# Patient Record
Sex: FEMALE | Race: WHITE | NOT HISPANIC OR LATINO | URBAN - METROPOLITAN AREA
[De-identification: names, ages, dates, MRNs, and addresses within clinical notes are randomized per-mention and may not be internally consistent; named-entity substitution may affect disease eponyms.]

---

## 2022-02-06 ENCOUNTER — INPATIENT (INPATIENT)
Facility: HOSPITAL | Age: 72
LOS: 2 days | Discharge: HOME HEALTH | DRG: 300 | End: 2022-02-09
Attending: THORACIC SURGERY (CARDIOTHORACIC VASCULAR SURGERY) | Admitting: THORACIC SURGERY (CARDIOTHORACIC VASCULAR SURGERY)
Payer: COMMERCIAL

## 2022-02-06 VITALS
TEMPERATURE: 98 F | DIASTOLIC BLOOD PRESSURE: 79 MMHG | OXYGEN SATURATION: 94 % | SYSTOLIC BLOOD PRESSURE: 130 MMHG | RESPIRATION RATE: 18 BRPM | HEART RATE: 128 BPM

## 2022-02-06 DIAGNOSIS — I71.00 DISSECTION OF UNSPECIFIED SITE OF AORTA: ICD-10-CM

## 2022-02-06 LAB
ALBUMIN SERPL ELPH-MCNC: 4.5 G/DL — SIGNIFICANT CHANGE UP (ref 3.3–5)
ALP SERPL-CCNC: 114 U/L — SIGNIFICANT CHANGE UP (ref 40–120)
ALT FLD-CCNC: 17 U/L — SIGNIFICANT CHANGE UP (ref 10–45)
ANION GAP SERPL CALC-SCNC: 14 MMOL/L — SIGNIFICANT CHANGE UP (ref 5–17)
APTT BLD: 32.1 SEC — SIGNIFICANT CHANGE UP (ref 27.5–35.5)
AST SERPL-CCNC: 21 U/L — SIGNIFICANT CHANGE UP (ref 10–40)
BASOPHILS # BLD AUTO: 0.04 K/UL — SIGNIFICANT CHANGE UP (ref 0–0.2)
BASOPHILS NFR BLD AUTO: 0.4 % — SIGNIFICANT CHANGE UP (ref 0–2)
BILIRUB SERPL-MCNC: 0.4 MG/DL — SIGNIFICANT CHANGE UP (ref 0.2–1.2)
BUN SERPL-MCNC: 12 MG/DL — SIGNIFICANT CHANGE UP (ref 7–23)
CALCIUM SERPL-MCNC: 9.7 MG/DL — SIGNIFICANT CHANGE UP (ref 8.4–10.5)
CHLORIDE SERPL-SCNC: 97 MMOL/L — SIGNIFICANT CHANGE UP (ref 96–108)
CO2 SERPL-SCNC: 22 MMOL/L — SIGNIFICANT CHANGE UP (ref 22–31)
CREAT SERPL-MCNC: 0.66 MG/DL — SIGNIFICANT CHANGE UP (ref 0.5–1.3)
EOSINOPHIL # BLD AUTO: 0.71 K/UL — HIGH (ref 0–0.5)
EOSINOPHIL NFR BLD AUTO: 6.4 % — HIGH (ref 0–6)
GAS PNL BLDV: SIGNIFICANT CHANGE UP
GLUCOSE SERPL-MCNC: 166 MG/DL — HIGH (ref 70–99)
HCT VFR BLD CALC: 46.7 % — HIGH (ref 34.5–45)
HGB BLD-MCNC: 14.6 G/DL — SIGNIFICANT CHANGE UP (ref 11.5–15.5)
IMM GRANULOCYTES NFR BLD AUTO: 0.4 % — SIGNIFICANT CHANGE UP (ref 0–1.5)
INR BLD: 1.08 RATIO — SIGNIFICANT CHANGE UP (ref 0.88–1.16)
LYMPHOCYTES # BLD AUTO: 1.2 K/UL — SIGNIFICANT CHANGE UP (ref 1–3.3)
LYMPHOCYTES # BLD AUTO: 10.8 % — LOW (ref 13–44)
MCHC RBC-ENTMCNC: 28.6 PG — SIGNIFICANT CHANGE UP (ref 27–34)
MCHC RBC-ENTMCNC: 31.3 GM/DL — LOW (ref 32–36)
MCV RBC AUTO: 91.6 FL — SIGNIFICANT CHANGE UP (ref 80–100)
MONOCYTES # BLD AUTO: 0.81 K/UL — SIGNIFICANT CHANGE UP (ref 0–0.9)
MONOCYTES NFR BLD AUTO: 7.3 % — SIGNIFICANT CHANGE UP (ref 2–14)
NEUTROPHILS # BLD AUTO: 8.34 K/UL — HIGH (ref 1.8–7.4)
NEUTROPHILS NFR BLD AUTO: 74.7 % — SIGNIFICANT CHANGE UP (ref 43–77)
NRBC # BLD: 0 /100 WBCS — SIGNIFICANT CHANGE UP (ref 0–0)
NT-PROBNP SERPL-SCNC: 46 PG/ML — SIGNIFICANT CHANGE UP (ref 0–300)
PLATELET # BLD AUTO: 181 K/UL — SIGNIFICANT CHANGE UP (ref 150–400)
POTASSIUM SERPL-MCNC: 4.1 MMOL/L — SIGNIFICANT CHANGE UP (ref 3.5–5.3)
POTASSIUM SERPL-SCNC: 4.1 MMOL/L — SIGNIFICANT CHANGE UP (ref 3.5–5.3)
PROT SERPL-MCNC: 8.2 G/DL — SIGNIFICANT CHANGE UP (ref 6–8.3)
PROTHROM AB SERPL-ACNC: 12.9 SEC — SIGNIFICANT CHANGE UP (ref 10.6–13.6)
RBC # BLD: 5.1 M/UL — SIGNIFICANT CHANGE UP (ref 3.8–5.2)
RBC # FLD: 14.8 % — HIGH (ref 10.3–14.5)
SARS-COV-2 RNA SPEC QL NAA+PROBE: SIGNIFICANT CHANGE UP
SODIUM SERPL-SCNC: 133 MMOL/L — LOW (ref 135–145)
TROPONIN T, HIGH SENSITIVITY RESULT: 9 NG/L — SIGNIFICANT CHANGE UP (ref 0–51)
WBC # BLD: 11.14 K/UL — HIGH (ref 3.8–10.5)
WBC # FLD AUTO: 11.14 K/UL — HIGH (ref 3.8–10.5)

## 2022-02-06 PROCEDURE — 74174 CTA ABD&PLVS W/CONTRAST: CPT | Mod: 26,MA

## 2022-02-06 PROCEDURE — 99291 CRITICAL CARE FIRST HOUR: CPT

## 2022-02-06 PROCEDURE — 36620 INSERTION CATHETER ARTERY: CPT

## 2022-02-06 PROCEDURE — 93010 ELECTROCARDIOGRAM REPORT: CPT

## 2022-02-06 PROCEDURE — 99221 1ST HOSP IP/OBS SF/LOW 40: CPT | Mod: 25

## 2022-02-06 PROCEDURE — 71275 CT ANGIOGRAPHY CHEST: CPT | Mod: 26,MA

## 2022-02-06 RX ORDER — FENTANYL CITRATE 50 UG/ML
50 INJECTION INTRAVENOUS ONCE
Refills: 0 | Status: DISCONTINUED | OUTPATIENT
Start: 2022-02-06 | End: 2022-02-06

## 2022-02-06 RX ORDER — LABETALOL HCL 100 MG
10 TABLET ORAL ONCE
Refills: 0 | Status: COMPLETED | OUTPATIENT
Start: 2022-02-06 | End: 2022-02-06

## 2022-02-06 RX ORDER — ESMOLOL HCL 100MG/10ML
50 VIAL (ML) INTRAVENOUS
Qty: 2500 | Refills: 0 | Status: DISCONTINUED | OUTPATIENT
Start: 2022-02-06 | End: 2022-02-07

## 2022-02-06 RX ORDER — ESMOLOL HCL 100MG/10ML
35000 VIAL (ML) INTRAVENOUS ONCE
Refills: 0 | Status: COMPLETED | OUTPATIENT
Start: 2022-02-06 | End: 2022-02-06

## 2022-02-06 RX ADMIN — Medication 21 MICROGRAM(S)/KG/MIN: at 22:52

## 2022-02-06 RX ADMIN — Medication 10 MILLIGRAM(S): at 22:50

## 2022-02-06 RX ADMIN — FENTANYL CITRATE 50 MICROGRAM(S): 50 INJECTION INTRAVENOUS at 22:53

## 2022-02-06 NOTE — ED PROVIDER NOTE - MDM PATIENT STATEMENT FOR ADDL TREATMENT
Drysol Pregnancy And Lactation Text: This medication is considered safe during pregnancy and breast feeding. Patient with one or more new problems requiring additional work-up/treatment.

## 2022-02-06 NOTE — ED PROVIDER NOTE - CLINICAL SUMMARY MEDICAL DECISION MAKING FREE TEXT BOX
72 yo F with a pmhx of HTN, HLD presents to the ED with flank pain that stared 2 days ago. noted aortic aneurysm on outpatient CT dated 1/28. flank pain intermittent and severe during episodes. vitals notable for tachycardia. BP in the 150s systolic. other vitals wnl. PE as noted above. concerns for vascular pathology vs PE. will order labs, imaging, ekg, meds, reassess

## 2022-02-06 NOTE — ED ADULT NURSE REASSESSMENT NOTE - NS ED NURSE REASSESS COMMENT FT1
Pt transported to CT with RN, ED tech & CTICU MD on cardiac monitor & O2 to CT. Pt then transported to ICU

## 2022-02-06 NOTE — ED PROVIDER NOTE - ATTENDING CONTRIBUTION TO CARE
few days cp / concerning ctap outpt, sent in  uncomfortable, tachycardic and tachypnic  check for pe and dissection. labs, ro acs. ekg, Symptomatic treatment.

## 2022-02-06 NOTE — ED PROVIDER NOTE - PROGRESS NOTE DETAILS
ct concerning for type b dissection. dw rads, dw ct icu. will start esmolol. -Wesley Bond MD- labetalol 10 given by me from crash cart as we awaiting esmolol from pharmacy. at bedside w ct surg. will admit to ICU .

## 2022-02-06 NOTE — ED PROVIDER NOTE - OBJECTIVE STATEMENT
[id number]  70 yo F with a pmhx of HTN, HLD presents to the ED with flank pain that stared 2 days ago. Her pain worsened over the last day prompting her to come to the ED. She had a CT scan perform as outpatient that found a aortic aneurysm measuring 4.8 cm; has not had follow up as of yet. She states her flank pain is over her R side and does not radiate anywhere else. Her pain is intermittent and rated 10/10 at its worst. no alleviating for exacerbating factors. She denies any other symptoms at bedside.

## 2022-02-06 NOTE — ED ADULT NURSE REASSESSMENT NOTE - NS ED NURSE REASSESS COMMENT FT1
Pt complaining of back pain and visibly uncomfortable. CTICU team at bedside. Pt ordered for fentanyl and Esmolol infusion started with 2nd RN. Per MD, no Esmolol push required d/t BP.

## 2022-02-06 NOTE — ED ADULT NURSE NOTE - OBJECTIVE STATEMENT
Pt is a 72 y/o female pmhx of HTN, HLD presents to the ED BIBA from  for concern flank pain. Pt began having flank pain 2 weeks ago that would come & go, she had an outpatient CT scan done on 1/28/22 that showed renal masses & an aortic aneurysm measuring 4.8cm. She had not yet followed up with her MD regarding results but today was having epigastric discomfort & pain under her L breast in addition to flank pain. She went to  and was advised to go to the ED. Pt presents alert & oriented x 4, anxious, able to follow commands, speech clear. Purse lip breathing noted, tachypneic with a respiratory rate of 35. On cardiac monitor, sinus tach to 130's. Abdomen firm & slightly distended. Spontaneously moving all extremities, ambulates without assist. Strong peripheral pulses noted b/l, no edema noted. Equal radial pulses b/l. Skin warm, clean, dry & intact. Denies n/v/d, fever, chills, weakness, dizziness, changes in vision. Pt is a former smoker and quit smoking last month. MD Bond at bedside. Call grajeda within reach, bed in lowest position, side rails up, wheels locked.

## 2022-02-07 DIAGNOSIS — F17.200 NICOTINE DEPENDENCE, UNSPECIFIED, UNCOMPLICATED: ICD-10-CM

## 2022-02-07 DIAGNOSIS — E03.9 HYPOTHYROIDISM, UNSPECIFIED: ICD-10-CM

## 2022-02-07 DIAGNOSIS — E78.5 HYPERLIPIDEMIA, UNSPECIFIED: ICD-10-CM

## 2022-02-07 DIAGNOSIS — R91.1 SOLITARY PULMONARY NODULE: ICD-10-CM

## 2022-02-07 DIAGNOSIS — I10 ESSENTIAL (PRIMARY) HYPERTENSION: ICD-10-CM

## 2022-02-07 DIAGNOSIS — I71.01 DISSECTION OF THORACIC AORTA: ICD-10-CM

## 2022-02-07 LAB
A1C WITH ESTIMATED AVERAGE GLUCOSE RESULT: 6.6 % — HIGH (ref 4–5.6)
ALBUMIN SERPL ELPH-MCNC: 4.2 G/DL — SIGNIFICANT CHANGE UP (ref 3.3–5)
ALP SERPL-CCNC: 105 U/L — SIGNIFICANT CHANGE UP (ref 40–120)
ALT FLD-CCNC: 14 U/L — SIGNIFICANT CHANGE UP (ref 10–45)
ANION GAP SERPL CALC-SCNC: 14 MMOL/L — SIGNIFICANT CHANGE UP (ref 5–17)
APPEARANCE UR: CLEAR — SIGNIFICANT CHANGE UP
APTT BLD: 31.8 SEC — SIGNIFICANT CHANGE UP (ref 27.5–35.5)
AST SERPL-CCNC: 13 U/L — SIGNIFICANT CHANGE UP (ref 10–40)
BACTERIA # UR AUTO: ABNORMAL
BASOPHILS # BLD AUTO: 0.03 K/UL — SIGNIFICANT CHANGE UP (ref 0–0.2)
BASOPHILS NFR BLD AUTO: 0.3 % — SIGNIFICANT CHANGE UP (ref 0–2)
BILIRUB SERPL-MCNC: 0.5 MG/DL — SIGNIFICANT CHANGE UP (ref 0.2–1.2)
BILIRUB UR-MCNC: NEGATIVE — SIGNIFICANT CHANGE UP
BLD GP AB SCN SERPL QL: NEGATIVE — SIGNIFICANT CHANGE UP
BUN SERPL-MCNC: 12 MG/DL — SIGNIFICANT CHANGE UP (ref 7–23)
CALCIUM SERPL-MCNC: 9.1 MG/DL — SIGNIFICANT CHANGE UP (ref 8.4–10.5)
CHLORIDE SERPL-SCNC: 97 MMOL/L — SIGNIFICANT CHANGE UP (ref 96–108)
CO2 SERPL-SCNC: 21 MMOL/L — LOW (ref 22–31)
COLOR SPEC: SIGNIFICANT CHANGE UP
CREAT SERPL-MCNC: 0.74 MG/DL — SIGNIFICANT CHANGE UP (ref 0.5–1.3)
DIFF PNL FLD: NEGATIVE — SIGNIFICANT CHANGE UP
EOSINOPHIL # BLD AUTO: 0.66 K/UL — HIGH (ref 0–0.5)
EOSINOPHIL NFR BLD AUTO: 6 % — SIGNIFICANT CHANGE UP (ref 0–6)
EPI CELLS # UR: 5 /HPF — SIGNIFICANT CHANGE UP
ESTIMATED AVERAGE GLUCOSE: 143 MG/DL — HIGH (ref 68–114)
GAS PNL BLDA: SIGNIFICANT CHANGE UP
GAS PNL BLDA: SIGNIFICANT CHANGE UP
GLUCOSE BLDC GLUCOMTR-MCNC: 127 MG/DL — HIGH (ref 70–99)
GLUCOSE BLDC GLUCOMTR-MCNC: 130 MG/DL — HIGH (ref 70–99)
GLUCOSE BLDC GLUCOMTR-MCNC: 136 MG/DL — HIGH (ref 70–99)
GLUCOSE BLDC GLUCOMTR-MCNC: 156 MG/DL — HIGH (ref 70–99)
GLUCOSE SERPL-MCNC: 152 MG/DL — HIGH (ref 70–99)
GLUCOSE UR QL: NEGATIVE — SIGNIFICANT CHANGE UP
HCT VFR BLD CALC: 41.5 % — SIGNIFICANT CHANGE UP (ref 34.5–45)
HCV AB S/CO SERPL IA: 0.15 S/CO — SIGNIFICANT CHANGE UP (ref 0–0.99)
HCV AB S/CO SERPL IA: 0.17 S/CO — SIGNIFICANT CHANGE UP (ref 0–0.99)
HCV AB SERPL-IMP: SIGNIFICANT CHANGE UP
HCV AB SERPL-IMP: SIGNIFICANT CHANGE UP
HGB BLD-MCNC: 13.4 G/DL — SIGNIFICANT CHANGE UP (ref 11.5–15.5)
HYALINE CASTS # UR AUTO: 8 /LPF — HIGH (ref 0–2)
IMM GRANULOCYTES NFR BLD AUTO: 0.5 % — SIGNIFICANT CHANGE UP (ref 0–1.5)
INR BLD: 1.13 RATIO — SIGNIFICANT CHANGE UP (ref 0.88–1.16)
KETONES UR-MCNC: NEGATIVE — SIGNIFICANT CHANGE UP
LEUKOCYTE ESTERASE UR-ACNC: NEGATIVE — SIGNIFICANT CHANGE UP
LYMPHOCYTES # BLD AUTO: 1.17 K/UL — SIGNIFICANT CHANGE UP (ref 1–3.3)
LYMPHOCYTES # BLD AUTO: 10.7 % — LOW (ref 13–44)
MCHC RBC-ENTMCNC: 29 PG — SIGNIFICANT CHANGE UP (ref 27–34)
MCHC RBC-ENTMCNC: 32.3 GM/DL — SIGNIFICANT CHANGE UP (ref 32–36)
MCV RBC AUTO: 89.8 FL — SIGNIFICANT CHANGE UP (ref 80–100)
MONOCYTES # BLD AUTO: 0.88 K/UL — SIGNIFICANT CHANGE UP (ref 0–0.9)
MONOCYTES NFR BLD AUTO: 8.1 % — SIGNIFICANT CHANGE UP (ref 2–14)
MRSA PCR RESULT.: SIGNIFICANT CHANGE UP
NEUTROPHILS # BLD AUTO: 8.13 K/UL — HIGH (ref 1.8–7.4)
NEUTROPHILS NFR BLD AUTO: 74.4 % — SIGNIFICANT CHANGE UP (ref 43–77)
NITRITE UR-MCNC: POSITIVE
NRBC # BLD: 0 /100 WBCS — SIGNIFICANT CHANGE UP (ref 0–0)
NT-PROBNP SERPL-SCNC: 73 PG/ML — SIGNIFICANT CHANGE UP (ref 0–300)
PH UR: 7 — SIGNIFICANT CHANGE UP (ref 5–8)
PLATELET # BLD AUTO: 147 K/UL — LOW (ref 150–400)
POTASSIUM SERPL-MCNC: 3.8 MMOL/L — SIGNIFICANT CHANGE UP (ref 3.5–5.3)
POTASSIUM SERPL-SCNC: 3.8 MMOL/L — SIGNIFICANT CHANGE UP (ref 3.5–5.3)
PROT SERPL-MCNC: 7.6 G/DL — SIGNIFICANT CHANGE UP (ref 6–8.3)
PROT UR-MCNC: ABNORMAL
PROTHROM AB SERPL-ACNC: 13.5 SEC — SIGNIFICANT CHANGE UP (ref 10.6–13.6)
RBC # BLD: 4.62 M/UL — SIGNIFICANT CHANGE UP (ref 3.8–5.2)
RBC # FLD: 14.9 % — HIGH (ref 10.3–14.5)
RBC CASTS # UR COMP ASSIST: 3 /HPF — SIGNIFICANT CHANGE UP (ref 0–4)
RH IG SCN BLD-IMP: POSITIVE — SIGNIFICANT CHANGE UP
RH IG SCN BLD-IMP: POSITIVE — SIGNIFICANT CHANGE UP
S AUREUS DNA NOSE QL NAA+PROBE: SIGNIFICANT CHANGE UP
SODIUM SERPL-SCNC: 132 MMOL/L — LOW (ref 135–145)
SP GR SPEC: >1.05 (ref 1.01–1.02)
T4 AB SER-ACNC: 3.5 UG/DL — LOW (ref 4.6–12)
TSH SERPL-MCNC: 9.71 UIU/ML — HIGH (ref 0.27–4.2)
UROBILINOGEN FLD QL: NEGATIVE — SIGNIFICANT CHANGE UP
WBC # BLD: 10.93 K/UL — HIGH (ref 3.8–10.5)
WBC # FLD AUTO: 10.93 K/UL — HIGH (ref 3.8–10.5)
WBC UR QL: 2 /HPF — SIGNIFICANT CHANGE UP (ref 0–5)

## 2022-02-07 PROCEDURE — 99291 CRITICAL CARE FIRST HOUR: CPT

## 2022-02-07 PROCEDURE — 93010 ELECTROCARDIOGRAM REPORT: CPT

## 2022-02-07 PROCEDURE — 74018 RADEX ABDOMEN 1 VIEW: CPT | Mod: 26

## 2022-02-07 PROCEDURE — 93306 TTE W/DOPPLER COMPLETE: CPT | Mod: 26

## 2022-02-07 PROCEDURE — 71045 X-RAY EXAM CHEST 1 VIEW: CPT | Mod: 26

## 2022-02-07 PROCEDURE — 93880 EXTRACRANIAL BILAT STUDY: CPT | Mod: 26

## 2022-02-07 RX ORDER — SODIUM CHLORIDE 9 MG/ML
1000 INJECTION, SOLUTION INTRAVENOUS
Refills: 0 | Status: DISCONTINUED | OUTPATIENT
Start: 2022-02-07 | End: 2022-02-08

## 2022-02-07 RX ORDER — DEXTROSE 50 % IN WATER 50 %
25 SYRINGE (ML) INTRAVENOUS ONCE
Refills: 0 | Status: DISCONTINUED | OUTPATIENT
Start: 2022-02-07 | End: 2022-02-08

## 2022-02-07 RX ORDER — GLUCAGON INJECTION, SOLUTION 0.5 MG/.1ML
1 INJECTION, SOLUTION SUBCUTANEOUS ONCE
Refills: 0 | Status: DISCONTINUED | OUTPATIENT
Start: 2022-02-07 | End: 2022-02-08

## 2022-02-07 RX ORDER — LOVASTATIN 20 MG
0 TABLET ORAL
Qty: 0 | Refills: 0 | DISCHARGE

## 2022-02-07 RX ORDER — LABETALOL HCL 100 MG
0.5 TABLET ORAL
Qty: 200 | Refills: 0 | Status: DISCONTINUED | OUTPATIENT
Start: 2022-02-07 | End: 2022-02-07

## 2022-02-07 RX ORDER — METOPROLOL TARTRATE 50 MG
25 TABLET ORAL EVERY 12 HOURS
Refills: 0 | Status: DISCONTINUED | OUTPATIENT
Start: 2022-02-07 | End: 2022-02-07

## 2022-02-07 RX ORDER — DEXTROSE 50 % IN WATER 50 %
12.5 SYRINGE (ML) INTRAVENOUS ONCE
Refills: 0 | Status: DISCONTINUED | OUTPATIENT
Start: 2022-02-07 | End: 2022-02-08

## 2022-02-07 RX ORDER — LEVOTHYROXINE SODIUM 125 MCG
50 TABLET ORAL AT BEDTIME
Refills: 0 | Status: DISCONTINUED | OUTPATIENT
Start: 2022-02-07 | End: 2022-02-08

## 2022-02-07 RX ORDER — IPRATROPIUM/ALBUTEROL SULFATE 18-103MCG
3 AEROSOL WITH ADAPTER (GRAM) INHALATION EVERY 6 HOURS
Refills: 0 | Status: DISCONTINUED | OUTPATIENT
Start: 2022-02-07 | End: 2022-02-09

## 2022-02-07 RX ORDER — ALBUMIN HUMAN 25 %
250 VIAL (ML) INTRAVENOUS ONCE
Refills: 0 | Status: COMPLETED | OUTPATIENT
Start: 2022-02-07 | End: 2022-02-07

## 2022-02-07 RX ORDER — ASPIRIN/CALCIUM CARB/MAGNESIUM 324 MG
81 TABLET ORAL DAILY
Refills: 0 | Status: DISCONTINUED | OUTPATIENT
Start: 2022-02-07 | End: 2022-02-07

## 2022-02-07 RX ORDER — METOPROLOL TARTRATE 50 MG
25 TABLET ORAL EVERY 12 HOURS
Refills: 0 | Status: DISCONTINUED | OUTPATIENT
Start: 2022-02-07 | End: 2022-02-09

## 2022-02-07 RX ORDER — ASPIRIN/CALCIUM CARB/MAGNESIUM 324 MG
0 TABLET ORAL
Qty: 0 | Refills: 0 | DISCHARGE

## 2022-02-07 RX ORDER — SIMETHICONE 80 MG/1
80 TABLET, CHEWABLE ORAL ONCE
Refills: 0 | Status: COMPLETED | OUTPATIENT
Start: 2022-02-07 | End: 2022-02-07

## 2022-02-07 RX ORDER — METOPROLOL TARTRATE 50 MG
25 TABLET ORAL EVERY 8 HOURS
Refills: 0 | Status: DISCONTINUED | OUTPATIENT
Start: 2022-02-07 | End: 2022-02-07

## 2022-02-07 RX ORDER — INSULIN LISPRO 100/ML
VIAL (ML) SUBCUTANEOUS AT BEDTIME
Refills: 0 | Status: DISCONTINUED | OUTPATIENT
Start: 2022-02-07 | End: 2022-02-08

## 2022-02-07 RX ORDER — SODIUM CHLORIDE 9 MG/ML
3 INJECTION INTRAMUSCULAR; INTRAVENOUS; SUBCUTANEOUS EVERY 8 HOURS
Refills: 0 | Status: DISCONTINUED | OUTPATIENT
Start: 2022-02-07 | End: 2022-02-09

## 2022-02-07 RX ORDER — BUDESONIDE, MICRONIZED 100 %
0.5 POWDER (GRAM) MISCELLANEOUS
Refills: 0 | Status: DISCONTINUED | OUTPATIENT
Start: 2022-02-07 | End: 2022-02-09

## 2022-02-07 RX ORDER — INFLUENZA VIRUS VACCINE 15; 15; 15; 15 UG/.5ML; UG/.5ML; UG/.5ML; UG/.5ML
0.7 SUSPENSION INTRAMUSCULAR ONCE
Refills: 0 | Status: DISCONTINUED | OUTPATIENT
Start: 2022-02-07 | End: 2022-02-09

## 2022-02-07 RX ORDER — INSULIN LISPRO 100/ML
VIAL (ML) SUBCUTANEOUS
Refills: 0 | Status: DISCONTINUED | OUTPATIENT
Start: 2022-02-07 | End: 2022-02-08

## 2022-02-07 RX ORDER — BUPROPION HYDROCHLORIDE 150 MG/1
150 TABLET, EXTENDED RELEASE ORAL DAILY
Refills: 0 | Status: DISCONTINUED | OUTPATIENT
Start: 2022-02-07 | End: 2022-02-09

## 2022-02-07 RX ORDER — BUDESONIDE AND FORMOTEROL FUMARATE DIHYDRATE 160; 4.5 UG/1; UG/1
2 AEROSOL RESPIRATORY (INHALATION)
Refills: 0 | Status: DISCONTINUED | OUTPATIENT
Start: 2022-02-07 | End: 2022-02-07

## 2022-02-07 RX ORDER — ATORVASTATIN CALCIUM 80 MG/1
80 TABLET, FILM COATED ORAL AT BEDTIME
Refills: 0 | Status: DISCONTINUED | OUTPATIENT
Start: 2022-02-07 | End: 2022-02-09

## 2022-02-07 RX ORDER — NICOTINE POLACRILEX 2 MG
1 GUM BUCCAL DAILY
Refills: 0 | Status: DISCONTINUED | OUTPATIENT
Start: 2022-02-07 | End: 2022-02-09

## 2022-02-07 RX ORDER — DEXTROSE 50 % IN WATER 50 %
15 SYRINGE (ML) INTRAVENOUS ONCE
Refills: 0 | Status: DISCONTINUED | OUTPATIENT
Start: 2022-02-07 | End: 2022-02-08

## 2022-02-07 RX ADMIN — SODIUM CHLORIDE 3 MILLILITER(S): 9 INJECTION INTRAMUSCULAR; INTRAVENOUS; SUBCUTANEOUS at 05:05

## 2022-02-07 RX ADMIN — Medication 25 MILLIGRAM(S): at 09:16

## 2022-02-07 RX ADMIN — FENTANYL CITRATE 50 MICROGRAM(S): 50 INJECTION INTRAVENOUS at 08:20

## 2022-02-07 RX ADMIN — ATORVASTATIN CALCIUM 80 MILLIGRAM(S): 80 TABLET, FILM COATED ORAL at 22:26

## 2022-02-07 RX ADMIN — SODIUM CHLORIDE 3 MILLILITER(S): 9 INJECTION INTRAMUSCULAR; INTRAVENOUS; SUBCUTANEOUS at 13:27

## 2022-02-07 RX ADMIN — Medication 25 MILLIGRAM(S): at 18:50

## 2022-02-07 RX ADMIN — Medication 3 MILLILITER(S): at 17:21

## 2022-02-07 RX ADMIN — Medication 50 MICROGRAM(S): at 22:26

## 2022-02-07 RX ADMIN — Medication 0.5 MILLIGRAM(S): at 17:21

## 2022-02-07 RX ADMIN — Medication 1 PATCH: at 11:47

## 2022-02-07 RX ADMIN — Medication 3 MILLILITER(S): at 11:23

## 2022-02-07 RX ADMIN — Medication 3 MILLILITER(S): at 06:01

## 2022-02-07 RX ADMIN — Medication 2: at 06:51

## 2022-02-07 RX ADMIN — SIMETHICONE 80 MILLIGRAM(S): 80 TABLET, CHEWABLE ORAL at 16:03

## 2022-02-07 RX ADMIN — SODIUM CHLORIDE 3 MILLILITER(S): 9 INJECTION INTRAMUSCULAR; INTRAVENOUS; SUBCUTANEOUS at 22:07

## 2022-02-07 RX ADMIN — Medication 125 MILLILITER(S): at 01:00

## 2022-02-07 RX ADMIN — Medication 1 PATCH: at 18:49

## 2022-02-07 RX ADMIN — BUPROPION HYDROCHLORIDE 150 MILLIGRAM(S): 150 TABLET, EXTENDED RELEASE ORAL at 11:47

## 2022-02-07 RX ADMIN — Medication 0.5 MILLIGRAM(S): at 06:02

## 2022-02-07 NOTE — H&P ADULT - ASSESSMENT
71F PMH HTN, HLD, smoker presents with chest pain radiating to the back x 10 days which worsened in severity today. She presented to PCP who sent her for a Cat scan of her abdomen which revealed "my aorta was enlarged" and she was told to go to ER. Now complaining of 5/10 chest pain radiating to the back. Pain alternates between sharp and dull. CTA was performed, which revealed Chronic-appearing dissection versus intramural thrombus of the descending aorta, which is mildly dilated. No evidence of acute dissection. Fentanyl 50mg IVP x 1 given by ED staff. Labetalol 10mg IVP x 1 ordered with successful reduction in blood pressure. Esmolol infusion initiated in ED. Patient transferred to CTU for medical management of type B aortic dissection. Started on labetalol infusion.

## 2022-02-07 NOTE — PATIENT PROFILE ADULT - FALL HARM RISK - HARM RISK INTERVENTIONS

## 2022-02-07 NOTE — PHYSICAL THERAPY INITIAL EVALUATION ADULT - ADDITIONAL COMMENTS
Pt lives alone in an apt w/ no steps to negotiate. PTA recently started using [employer] for amb and was indep w/ all ADLs. Pt reports being told she needs and R hip replacement

## 2022-02-07 NOTE — H&P ADULT - HISTORY OF PRESENT ILLNESS
71F PMH HTN, HLD, smoker presents with chest pain radiating to the back x 10 days which worsened in severity today. She presented to PCP who sent her for a Cat scan of her abdomen which revealed "my aorta was enlarged" and she was told to go to ER. Now complaining of 5/10 chest pain radiating to the back. Pain alternates between sharp and dull. CTA was performed, which revealed Chronic-appearing dissection versus intramural thrombus of the descending aorta, which is mildly dilated. No evidence of acute dissection. Fentanyl 50mg IVP x 1 given by ED staff. Labetalol 10mg IVP x 1 ordered with successful reduction in blood pressure. Esmolol infusion initiated in ED. Patient transferred to CTU for medical management of type B aortic dissection.

## 2022-02-07 NOTE — PROGRESS NOTE ADULT - SUBJECTIVE AND OBJECTIVE BOX
Subjective:    Telemetry:      Vital Signs Last 24 Hrs  T(C): 36.4 (22 @ 16:00), Max: 37.8 (22 @ 02:00)  T(F): 97.6 (22 @ 16:00), Max: 100 (22 @ 02:00)  HR: 86 (22 @ 21:40) (76 - 106)  BP: 147/75 (22 @ 21:40) (87/54 - 147/75)  RR: 18 (22 @ 21:40) (17 - 222)  SpO2: 97% (22 @ 21:40) (93% - 100%)                    @ 07:01  -   @ 07:00  --------------------------------------------------------  IN: 280 mL / OUT: 650 mL / NET: -370 mL     @ 07:01  -   @ 23:15  --------------------------------------------------------  IN: 350 mL / OUT: 300 mL / NET: 50 mL          Daily     Daily Weight in k (2022 00:00)        CAPILLARY BLOOD GLUCOSE      POCT Blood Glucose.: 136 mg/dL (2022 21:58)  POCT Blood Glucose.: 130 mg/dL (2022 16:24)  POCT Blood Glucose.: 127 mg/dL (2022 11:35)  POCT Blood Glucose.: 156 mg/dL (2022 06:46)          Drains:     MS         [  ] Drainage:                 L Pleural  [  ]  Drainage:                R Pleural  [  ]  Drainage:    Pacing Wires        [  ]   Settings:                                  Isolated  [  ]    Coumadin    [ ] YES          [  ]      NO         Reason:                                 13.4   10.93 )-----------( 147      ( 2022 00:33 )             41.5       0207    132<L>  |  97  |  12  ----------------------------<  152<H>  3.8   |  21<L>  |  0.74    Ca    9.1      2022 00:33    TPro  7.6  /  Alb  4.2  /  TBili  0.5  /  DBili  x   /  AST  13  /  ALT  14  /  AlkPhos  105        PT/INR - ( 2022 00:33 )   PT: 13.5 sec;   INR: 1.13 ratio         PTT - ( 2022 00:33 )  PTT:31.8 sec    PHYSICAL EXAM:    Neurology: alert and oriented x 3, nonfocal, no gross deficits    CV:    Sternal Wound: CDI, Stable    Lungs:    Abdomen: soft, nontender, nondistended, (+) bowel sounds, (+) BM    :               Extremities:               albuterol/ipratropium for Nebulization 3 milliLiter(s) Nebulizer every 6 hours  atorvastatin 80 milliGRAM(s) Oral at bedtime  buDESOnide    Inhalation Suspension 0.5 milliGRAM(s) Inhalation two times a day  buPROPion XL (24-Hour) . 150 milliGRAM(s) Oral daily  dextrose 40% Gel 15 Gram(s) Oral once  dextrose 5%. 1000 milliLiter(s) IV Continuous <Continuous>  dextrose 5%. 1000 milliLiter(s) IV Continuous <Continuous>  dextrose 50% Injectable 25 Gram(s) IV Push once  dextrose 50% Injectable 12.5 Gram(s) IV Push once  dextrose 50% Injectable 25 Gram(s) IV Push once  glucagon  Injectable 1 milliGRAM(s) IntraMuscular once  influenza  Vaccine (HIGH DOSE) 0.7 milliLiter(s) IntraMuscular once  insulin lispro (ADMELOG) corrective regimen sliding scale   SubCutaneous three times a day before meals  insulin lispro (ADMELOG) corrective regimen sliding scale   SubCutaneous at bedtime  labetalol Infusion 0.5 mG/Min IV Continuous <Continuous>  levothyroxine Injectable 50 MICROGram(s) IV Push at bedtime  metoprolol tartrate 25 milliGRAM(s) Oral every 12 hours  nicotine -  14 mG/24Hr(s) Patch 1 patch Transdermal daily  sodium chloride 0.9% lock flush 3 milliLiter(s) IV Push every 8 hours                    Physical Therapy Rec:   Home  [  ]   Home w/ PT  [  ]  Rehab  [  ]    Discussed with Cardiothoracic Team at AM rounds. Polish  [place] #[id number] used     Subjective: "Sometimes I have pain in my stomach or back, but not right now." Denies chest pain, SOB, n/v/d.     Telemetry: SR 70s-80s     Vital Signs Last 24 Hrs  T(C): 36.4 (22 @ 16:00), Max: 37.8 (22 @ 02:00)  T(F): 97.6 (22 @ 16:00), Max: 100 (22 @ 02:00)  HR: 86 (22 @ 21:40) (76 - 106)  BP: 147/75 (22 @ 21:40) (87/54 - 147/75)  RR: 18 (22 @ 21:40) (17 - 222)  SpO2: 97% (22 @ 21:40) (93% - 100%)              @ 07:01  -   @ 07:00  --------------------------------------------------------  IN: 280 mL / OUT: 650 mL / NET: -370 mL     @ 07:01  -   @ 23:15  --------------------------------------------------------  IN: 350 mL / OUT: 300 mL / NET: 50 mL    Daily     Daily Weight in k (2022 00:00)      POCT Blood Glucose.: 136 mg/dL (2022 21:58)  POCT Blood Glucose.: 130 mg/dL (2022 16:24)  POCT Blood Glucose.: 127 mg/dL (2022 11:35)  POCT Blood Glucose.: 156 mg/dL (2022 06:46)                              13.4   10.93 )-----------( 147      ( 2022 00:33 )             41.5         132<L>  |  97  |  12  ----------------------------<  152<H>  3.8   |  21<L>  |  0.74    Ca    9.1      2022 00:33    TPro  7.6  /  Alb  4.2  /  TBili  0.5  /  DBili  x   /  AST  13  /  ALT  14  /  AlkPhos  105      PT/INR - ( 2022 00:33 )   PT: 13.5 sec;   INR: 1.13 ratio       PTT - ( 2022 00:33 )  PTT:31.8 sec      PHYSICAL EXAM:    Neurology: alert and oriented x 3, nonfocal, no gross deficits    CV: RRR, (+)S1/S2, no murmur appreciated    Lungs: CTA b/l, no wheezes, rales, or rhonchi     Abdomen: soft, nontender, nondistended, (+) bowel sounds    : voiding freely               Extremities: no edema b/l, PPP b/l, no calf tenderness, BRYAN           albuterol/ipratropium for Nebulization 3 milliLiter(s) Nebulizer every 6 hours  atorvastatin 80 milliGRAM(s) Oral at bedtime  buDESOnide    Inhalation Suspension 0.5 milliGRAM(s) Inhalation two times a day  buPROPion XL (24-Hour) . 150 milliGRAM(s) Oral daily  glucagon  Injectable 1 milliGRAM(s) IntraMuscular once  influenza  Vaccine (HIGH DOSE) 0.7 milliLiter(s) IntraMuscular once  insulin lispro (ADMELOG) corrective regimen sliding scale   SubCutaneous three times a day before meals  insulin lispro (ADMELOG) corrective regimen sliding scale   SubCutaneous at bedtime  levothyroxine Injectable 50 MICROGram(s) IV Push at bedtime  metoprolol tartrate 25 milliGRAM(s) Oral every 12 hours  nicotine -  14 mG/24Hr(s) Patch 1 patch Transdermal daily       Physical Therapy Rec:   Home  [  ]   Home w/ PT  [ x ]  Rehab  [  ]    Discussed with Cardiothoracic Team at AM rounds.

## 2022-02-07 NOTE — H&P ADULT - NSHPLABSRESULTS_GEN_ALL_CORE
CTA C/A/P IMPRESSION:  Chronic-appearing dissection versus intramural thrombus of the descending   aorta, which is mildly dilated. No evidence of acute dissection.    Small infrarenal abdominal aortic aneurysm.    Mild patchy groundglass and nodular opacities throughout all lobes of the   lungs are nonspecific and could be seen in the setting of multifocal   infection or pulmonary edema.    A 1.3 cm left upper lobe lung nodule. Consider noncontrast chest CT in   one month to evaluate for resolution.

## 2022-02-07 NOTE — H&P ADULT - PROBLEM SELECTOR PLAN 1
Arterial line placed. Medical management with IV labetalol infusion for blood pressure control. Consider PO labetalol.    Dr. Casas at bedside  Plan discussed with Dr. Sánchez

## 2022-02-07 NOTE — H&P ADULT - NSHPSOCIALHISTORY_GEN_ALL_CORE
Current smoker Smoked cigarettes, 6-7 cigarettes/day x 58 years. Stopped 1 month ago because she no longer liked the taste.  No alcohol, no drugs. 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

## 2022-02-07 NOTE — PHYSICAL THERAPY INITIAL EVALUATION ADULT - PERTINENT HX OF CURRENT PROBLEM, REHAB EVAL
70 yo Polish speaking F w/ PMH HTN, HLD, smoker p/w CP radiating to the back x 10 days which worsened in severity bringing her to ED. CTA was performed, which revealed Chronic-appearing dissection versus intramural thrombus of the descending aorta, which is mildly dilated.Patient transferred to CTU for medical management of type B aortic dissection.

## 2022-02-07 NOTE — PROGRESS NOTE ADULT - SUBJECTIVE AND OBJECTIVE BOX
STORM [facility]ANCOURT  MRN-[id number]  Patient is a 71y old  Female who presents with a chief complaint of Chest pain radiating to back (07 Feb 2022 00:20)    HPI:  71F PMH HTN, HLD, smoker presents with chest pain radiating to the back x 10 days which worsened in severity today. She presented to PCP who sent her for a Cat scan of her abdomen which revealed "my aorta was enlarged" and she was told to go to ER. Now complaining of 5/10 chest pain radiating to the back. Pain alternates between sharp and dull. CTA was performed, which revealed Chronic-appearing dissection versus intramural thrombus of the descending aorta, which is mildly dilated. No evidence of acute dissection. Fentanyl 50mg IVP x 1 given by ED staff. Labetalol 10mg IVP x 1 ordered with successful reduction in blood pressure. Esmolol infusion initiated in ED. Patient transferred to CTU for medical management of type B aortic dissection.     (07 Feb 2022 00:20)      Surgery/Hospital course:  2/6 CTA C/A/P: chronic appearing dissection versus intramural thrombus of the descending aorta, which is mildly dilated. No evidence of acute dissection. small infrarenal abdominal aortic aneurysm. Mild marc ground-glass and nodular opacities throughout all lobes of the lungs. A 1.3cm left upper lobe lung nodule   2/7 admitted for type B dissection     REVIEW OF SYSTEMS:  Gen: No fever  EYES/ENT: No visual changes;  No vertigo or throat pain   NECK: No pain   RES:  No shortness of breath or Cough  CARD: No chest pain   GI: No abdominal pain  : No dysuria  NEURO: No weakness  SKIN: No itching, rashes     Vital Signs Last 24 Hrs  T(C): 36.4 (07 Feb 2022 08:00), Max: 37.8 (07 Feb 2022 02:00)  T(F): 97.6 (07 Feb 2022 08:00), Max: 100 (07 Feb 2022 02:00)  HR: 91 (07 Feb 2022 07:00) (83 - 130)  BP: 113/67 (07 Feb 2022 07:00) (87/54 - 130/92)  BP(mean): 83 (07 Feb 2022 07:00) (65 - 104)  RR: 19 (07 Feb 2022 07:00) (18 - 222)  SpO2: 93% (07 Feb 2022 07:00) (93% - 98%)    ============================I/O===========================   I&O's Detail    06 Feb 2022 07:01  -  07 Feb 2022 07:00  --------------------------------------------------------  IN:    Albumin 5%  - 250 mL: 250 mL    Labetalol: 30 mL  Total IN: 280 mL    OUT:    Esmolol: 0 mL    Voided (mL): 650 mL  Total OUT: 650 mL    Total NET: -370 mL      07 Feb 2022 07:01  -  07 Feb 2022 08:10  --------------------------------------------------------  IN:    Oral Fluid: 150 mL  Total IN: 150 mL    OUT:    Labetalol: 0 mL  Total OUT: 0 mL    Total NET: 150 mL        ============================ LABS =========================                        13.4   10.93 )-----------( 147      ( 07 Feb 2022 00:33 )             41.5     02-07    132<L>  |  97  |  12  ----------------------------<  152<H>  3.8   |  21<L>  |  0.74    Ca    9.1      07 Feb 2022 00:33    TPro  7.6  /  Alb  4.2  /  TBili  0.5  /  DBili  x   /  AST  13  /  ALT  14  /  AlkPhos  105  02-07    LIVER FUNCTIONS - ( 07 Feb 2022 00:33 )  Alb: 4.2 g/dL / Pro: 7.6 g/dL / ALK PHOS: 105 U/L / ALT: 14 U/L / AST: 13 U/L / GGT: x           PT/INR - ( 07 Feb 2022 00:33 )   PT: 13.5 sec;   INR: 1.13 ratio         PTT - ( 07 Feb 2022 00:33 )  PTT:31.8 sec  ABG - ( 07 Feb 2022 01:57 )  pH, Arterial: 7.40  pH, Blood: x     /  pCO2: 40    /  pO2: 100   / HCO3: 25    / Base Excess: 0.0   /  SaO2: 98.7              Urinalysis Basic - ( 07 Feb 2022 00:33 )    Color: Light Yellow / Appearance: Clear / SG: >1.050 / pH: x  Gluc: x / Ketone: Negative  / Bili: Negative / Urobili: Negative   Blood: x / Protein: 30 mg/dL / Nitrite: Positive   Leuk Esterase: Negative / RBC: 3 /hpf / WBC 2 /HPF   Sq Epi: x / Non Sq Epi: 5 /hpf / Bacteria: Many      ======================Microbiology/Radiology=================  CXR: Reviewed  ======================================================  PAST MEDICAL & SURGICAL HISTORY:  HTN (hypertension)    HLD (hyperlipidemia)      ====================ASSESSMENT ==============  Admitted for Type B dissection 2/7   Hemodynamic instability   Hypertension   Lactic acidosis   Thrombocytopenia   Stress hyperglycemia   Hypothyroidism     Plan:  ====================== NEUROLOGY=====================  Continue close monitoring of neuro status     ==================== RESPIRATORY======================  Supplemental O2 via 2L NC   Encourage incentive spirometry, continue pulse ox monitoring, follow ABGs   Continue bronchodilators     albuterol/ipratropium for Nebulization 3 milliLiter(s) Nebulizer every 6 hours  buDESOnide    Inhalation Suspension 0.5 milliGRAM(s) Inhalation two times a day    ====================CARDIOVASCULAR==================  Admitted on 2/7 for management of Type B dissection, no acute operative intervention at this time, continue medical management   CTA C/A/P on 2/6: Chronic-appearing dissection versus intramural thrombus of the descending aorta, which is mildly dilated.  Lactate peaked to 2.4 yesterday, now 1.5, continue trending   Blood pressure management with IV Labetalol  Lipitor for CAD     atorvastatin 80 milliGRAM(s) Oral at bedtime  labetalol Infusion 0.5 mG/Min (30 mL/Hr) IV Continuous <Continuous>    ===================HEMATOLOGIC/ONC ===================  Thrombocytopenia, monitor H&H/Plts     ===================== RENAL =========================  Continue monitoring urine output, I&Os, Bun/Cr  Replete lytes PRN. Keep K> 4 and Mg >2    ==================== GASTROINTESTINAL===================  Tolerating PO DASH/TLC diet.     dextrose 5%. 1000 milliLiter(s) (100 mL/Hr) IV Continuous <Continuous>  sodium chloride 0.9% lock flush 3 milliLiter(s) IV Push every 8 hours    =======================    ENDOCRINE  =====================  Stress hyperglycemia, continue glucose control with admelog sliding scale   Hypothyroidism, c/w Synthroid     dextrose 40% Gel 15 Gram(s) Oral once  dextrose 50% Injectable 12.5 Gram(s) IV Push once  dextrose 50% Injectable 25 Gram(s) IV Push once  glucagon  Injectable 1 milliGRAM(s) IntraMuscular once  insulin lispro (ADMELOG) corrective regimen sliding scale   SubCutaneous three times a day before meals  insulin lispro (ADMELOG) corrective regimen sliding scale   SubCutaneous at bedtime  levothyroxine Injectable 50 MICROGram(s) IV Push at bedtime    ========================INFECTIOUS DISEASE================  Afebrile, WBC downtrending 11.14->10.93   Continue trending WBC and monitoring fever curve       Patient requires continuous monitoring with bedside rhythm monitoring, pulse ox monitoring, and intermittent blood gas analysis. Care plan discussed with ICU care team. Patient remained critical and at risk for life threatening decompensation.    By signing my name below, I, Jackie Fernandez, attest that this documentation has been prepared under the direction and in the presence of Alli Soliman MD   Electronically signed: Gabriel Guardado Matthew Pierce, personally performed the services described in this documentation. all medical record entries made by the scribe were at my direction and in my presence. I have reviewed the chart and agree that the record reflects my personal performance and is accurate and complete  Electronically signed: Alli Soliman MD 02-07-22 @ 08:10       [facility]  MRN-[id number]  Patient is a 71y old  Female who presents with a chief complaint of Chest pain radiating to back (07 Feb 2022 00:20)    HPI:  71F PMH HTN, HLD, smoker presents with chest pain radiating to the back x 10 days which worsened in severity today. She presented to PCP who sent her for a Cat scan of her abdomen which revealed "my aorta was enlarged" and she was told to go to ER. Now complaining of 5/10 chest pain radiating to the back. Pain alternates between sharp and dull. CTA was performed, which revealed Chronic-appearing dissection versus intramural thrombus of the descending aorta, which is mildly dilated. No evidence of acute dissection. Fentanyl 50mg IVP x 1 given by ED staff. Labetalol 10mg IVP x 1 ordered with successful reduction in blood pressure. Esmolol infusion initiated in ED. Patient transferred to CTU for medical management of type B aortic dissection.     (07 Feb 2022 00:20)      Surgery/Hospital course:  2/6 CTA C/A/P: chronic appearing dissection versus intramural thrombus of the descending aorta, which is mildly dilated. No evidence of acute dissection. small infrarenal abdominal aortic aneurysm. Mild marc ground-glass and nodular opacities throughout all lobes of the lungs. A 1.3cm left upper lobe lung nodule   2/7 admitted for type B dissection     REVIEW OF SYSTEMS:  Gen: No fever  EYES/ENT: No visual changes;  No vertigo or throat pain   NECK: No pain   RES:  No shortness of breath or Cough  CARD: No chest pain   GI: No abdominal pain  : No dysuria  NEURO: No weakness  SKIN: No itching, rashes     Vital Signs Last 24 Hrs  T(C): 36.4 (07 Feb 2022 08:00), Max: 37.8 (07 Feb 2022 02:00)  T(F): 97.6 (07 Feb 2022 08:00), Max: 100 (07 Feb 2022 02:00)  HR: 91 (07 Feb 2022 07:00) (83 - 130)  BP: 113/67 (07 Feb 2022 07:00) (87/54 - 130/92)  BP(mean): 83 (07 Feb 2022 07:00) (65 - 104)  RR: 19 (07 Feb 2022 07:00) (18 - 222)  SpO2: 93% (07 Feb 2022 07:00) (93% - 98%)    ============================I/O===========================   I&O's Detail    06 Feb 2022 07:01  -  07 Feb 2022 07:00  --------------------------------------------------------  IN:    Albumin 5%  - 250 mL: 250 mL    Labetalol: 30 mL  Total IN: 280 mL    OUT:    Esmolol: 0 mL    Voided (mL): 650 mL  Total OUT: 650 mL    Total NET: -370 mL      07 Feb 2022 07:01  -  07 Feb 2022 08:10  --------------------------------------------------------  IN:    Oral Fluid: 150 mL  Total IN: 150 mL    OUT:    Labetalol: 0 mL  Total OUT: 0 mL    Total NET: 150 mL        ============================ LABS =========================                        13.4   10.93 )-----------( 147      ( 07 Feb 2022 00:33 )             41.5     02-07    132<L>  |  97  |  12  ----------------------------<  152<H>  3.8   |  21<L>  |  0.74    Ca    9.1      07 Feb 2022 00:33    TPro  7.6  /  Alb  4.2  /  TBili  0.5  /  DBili  x   /  AST  13  /  ALT  14  /  AlkPhos  105  02-07    LIVER FUNCTIONS - ( 07 Feb 2022 00:33 )  Alb: 4.2 g/dL / Pro: 7.6 g/dL / ALK PHOS: 105 U/L / ALT: 14 U/L / AST: 13 U/L / GGT: x           PT/INR - ( 07 Feb 2022 00:33 )   PT: 13.5 sec;   INR: 1.13 ratio         PTT - ( 07 Feb 2022 00:33 )  PTT:31.8 sec  ABG - ( 07 Feb 2022 01:57 )  pH, Arterial: 7.40  pH, Blood: x     /  pCO2: 40    /  pO2: 100   / HCO3: 25    / Base Excess: 0.0   /  SaO2: 98.7              Urinalysis Basic - ( 07 Feb 2022 00:33 )    Color: Light Yellow / Appearance: Clear / SG: >1.050 / pH: x  Gluc: x / Ketone: Negative  / Bili: Negative / Urobili: Negative   Blood: x / Protein: 30 mg/dL / Nitrite: Positive   Leuk Esterase: Negative / RBC: 3 /hpf / WBC 2 /HPF   Sq Epi: x / Non Sq Epi: 5 /hpf / Bacteria: Many      ======================Microbiology/Radiology=================  CXR: Reviewed  ======================================================  PAST MEDICAL & SURGICAL HISTORY:  HTN (hypertension)    HLD (hyperlipidemia)      ====================ASSESSMENT ==============  Admitted for Type B dissection 2/7   Hemodynamic instability   Hypertension   Lactic acidosis   Thrombocytopenia   Stress hyperglycemia   Hypothyroidism   Active smoker     Plan:  ====================== NEUROLOGY=====================  Active smoker, started Wellbutrin this AM   Continue close monitoring of neuro status     ==================== RESPIRATORY======================  Supplemental O2 via 2L NC   Encourage incentive spirometry, continue pulse ox monitoring, follow ABGs   Continue bronchodilators     albuterol/ipratropium for Nebulization 3 milliLiter(s) Nebulizer every 6 hours  buDESOnide    Inhalation Suspension 0.5 milliGRAM(s) Inhalation two times a day    ====================CARDIOVASCULAR==================  Admitted on 2/7 for management of Type B dissection, no acute operative intervention at this time, continue medical management   CTA C/A/P on 2/6: Chronic-appearing dissection versus intramural thrombus of the descending aorta, which is mildly dilated.  Lactate peaked to 2.4 yesterday, now 1.5, continue trending   IV Labetalol weaned to off this AM, started Lopressor 25mg q12h today for BP management  Lipitor for CAD     atorvastatin 80 milliGRAM(s) Oral at bedtime  labetalol Infusion 0.5 mG/Min (30 mL/Hr) IV Continuous <Continuous>    ===================HEMATOLOGIC/ONC ===================  Thrombocytopenia, monitor H&H/Plts     ===================== RENAL =========================  Continue monitoring urine output, I&Os, Bun/Cr  Replete lytes PRN. Keep K> 4 and Mg >2    ==================== GASTROINTESTINAL===================  Tolerating PO DASH/TLC diet.     dextrose 5%. 1000 milliLiter(s) (100 mL/Hr) IV Continuous <Continuous>  sodium chloride 0.9% lock flush 3 milliLiter(s) IV Push every 8 hours    =======================    ENDOCRINE  =====================  Stress hyperglycemia, continue glucose control with admelog sliding scale   Hypothyroidism, c/w Synthroid     dextrose 40% Gel 15 Gram(s) Oral once  dextrose 50% Injectable 12.5 Gram(s) IV Push once  dextrose 50% Injectable 25 Gram(s) IV Push once  glucagon  Injectable 1 milliGRAM(s) IntraMuscular once  insulin lispro (ADMELOG) corrective regimen sliding scale   SubCutaneous three times a day before meals  insulin lispro (ADMELOG) corrective regimen sliding scale   SubCutaneous at bedtime  levothyroxine Injectable 50 MICROGram(s) IV Push at bedtime    ========================INFECTIOUS DISEASE================  Afebrile, WBC downtrending 11.14->10.93   Continue trending WBC and monitoring fever curve       Patient requires continuous monitoring with bedside rhythm monitoring, pulse ox monitoring, and intermittent blood gas analysis. Care plan discussed with ICU care team. Patient remained critical and at risk for life threatening decompensation.    By signing my name below, I, Jackie Fernandez, attest that this documentation has been prepared under the direction and in the presence of Alli Soliman MD   Electronically signed: Gabriel Guardado, Alli Soliman, personally performed the services described in this documentation. all medical record entries made by the scribe were at my direction and in my presence. I have reviewed the chart and agree that the record reflects my personal performance and is accurate and complete  Electronically signed: Alli Soliman MD 02-07-22 @ 08:10       [facility]  MRN-[id number]  Patient is a 71y old  Female who presents with a chief complaint of Chest pain radiating to back (07 Feb 2022 00:20)    HPI:  71F PMH HTN, HLD, smoker presents with chest pain radiating to the back x 10 days which worsened in severity today. She presented to PCP who sent her for a Cat scan of her abdomen which revealed "my aorta was enlarged" and she was told to go to ER. Now complaining of 5/10 chest pain radiating to the back. Pain alternates between sharp and dull. CTA was performed, which revealed Chronic-appearing dissection versus intramural thrombus of the descending aorta, which is mildly dilated. No evidence of acute dissection. Fentanyl 50mg IVP x 1 given by ED staff. Labetalol 10mg IVP x 1 ordered with successful reduction in blood pressure. Esmolol infusion initiated in ED. Patient transferred to CTU for medical management of type B aortic dissection.     (07 Feb 2022 00:20)      Surgery/Hospital course:  2/6 CTA C/A/P: chronic appearing dissection versus intramural thrombus of the descending aorta, which is mildly dilated. No evidence of acute dissection. small infrarenal abdominal aortic aneurysm. Mild marc ground-glass and nodular opacities throughout all lobes of the lungs. A 1.3cm left upper lobe lung nodule   2/7 admitted for type B dissection     REVIEW OF SYSTEMS:  Gen: No fever  EYES/ENT: No visual changes;  No vertigo or throat pain   NECK: No pain   RES:  No shortness of breath or Cough  CARD: No chest pain   GI: No abdominal pain  : No dysuria  NEURO: No weakness  SKIN: No itching, rashes     Vital Signs Last 24 Hrs  T(C): 36.4 (07 Feb 2022 08:00), Max: 37.8 (07 Feb 2022 02:00)  T(F): 97.6 (07 Feb 2022 08:00), Max: 100 (07 Feb 2022 02:00)  HR: 91 (07 Feb 2022 07:00) (83 - 130)  BP: 113/67 (07 Feb 2022 07:00) (87/54 - 130/92)  BP(mean): 83 (07 Feb 2022 07:00) (65 - 104)  RR: 19 (07 Feb 2022 07:00) (18 - 222)  SpO2: 93% (07 Feb 2022 07:00) (93% - 98%)    ============================I/O===========================   I&O's Detail    06 Feb 2022 07:01  -  07 Feb 2022 07:00  --------------------------------------------------------  IN:    Albumin 5%  - 250 mL: 250 mL    Labetalol: 30 mL  Total IN: 280 mL    OUT:    Esmolol: 0 mL    Voided (mL): 650 mL  Total OUT: 650 mL    Total NET: -370 mL      07 Feb 2022 07:01  -  07 Feb 2022 08:10  --------------------------------------------------------  IN:    Oral Fluid: 150 mL  Total IN: 150 mL    OUT:    Labetalol: 0 mL  Total OUT: 0 mL    Total NET: 150 mL        ============================ LABS =========================                        13.4   10.93 )-----------( 147      ( 07 Feb 2022 00:33 )             41.5     02-07    132<L>  |  97  |  12  ----------------------------<  152<H>  3.8   |  21<L>  |  0.74    Ca    9.1      07 Feb 2022 00:33    TPro  7.6  /  Alb  4.2  /  TBili  0.5  /  DBili  x   /  AST  13  /  ALT  14  /  AlkPhos  105  02-07    LIVER FUNCTIONS - ( 07 Feb 2022 00:33 )  Alb: 4.2 g/dL / Pro: 7.6 g/dL / ALK PHOS: 105 U/L / ALT: 14 U/L / AST: 13 U/L / GGT: x           PT/INR - ( 07 Feb 2022 00:33 )   PT: 13.5 sec;   INR: 1.13 ratio         PTT - ( 07 Feb 2022 00:33 )  PTT:31.8 sec  ABG - ( 07 Feb 2022 01:57 )  pH, Arterial: 7.40  pH, Blood: x     /  pCO2: 40    /  pO2: 100   / HCO3: 25    / Base Excess: 0.0   /  SaO2: 98.7              Urinalysis Basic - ( 07 Feb 2022 00:33 )    Color: Light Yellow / Appearance: Clear / SG: >1.050 / pH: x  Gluc: x / Ketone: Negative  / Bili: Negative / Urobili: Negative   Blood: x / Protein: 30 mg/dL / Nitrite: Positive   Leuk Esterase: Negative / RBC: 3 /hpf / WBC 2 /HPF   Sq Epi: x / Non Sq Epi: 5 /hpf / Bacteria: Many      ======================Microbiology/Radiology=================  CXR: Reviewed  ======================================================  PAST MEDICAL & SURGICAL HISTORY:  HTN (hypertension)    HLD (hyperlipidemia)      ====================ASSESSMENT ==============  Admitted for Type B dissection 2/7   Hemodynamic instability   Hypertension   Lactic acidosis   Thrombocytopenia   Stress hyperglycemia   Hypothyroidism   Active smoker     Plan:  ====================== NEUROLOGY=====================  Active smoker, started Wellbutrin this AM   Continue close monitoring of neuro status     ==================== RESPIRATORY======================  Supplemental O2 via 2L NC   Encourage incentive spirometry, continue pulse ox monitoring, follow ABGs   Continue bronchodilators     albuterol/ipratropium for Nebulization 3 milliLiter(s) Nebulizer every 6 hours  buDESOnide    Inhalation Suspension 0.5 milliGRAM(s) Inhalation two times a day    ====================CARDIOVASCULAR==================  Admitted on 2/7 for management of Type B dissection, no acute operative intervention at this time, continue medical management   CTA C/A/P on 2/6: Chronic-appearing dissection versus intramural thrombus of the descending aorta, which is mildly dilated.  Lactate peaked to 2.4 yesterday, now 1.5, continue trending   IV Labetalol weaned to off this AM, started Lopressor 25mg q12h today for BP management  Lipitor for CAD     atorvastatin 80 milliGRAM(s) Oral at bedtime  labetalol Infusion 0.5 mG/Min (30 mL/Hr) IV Continuous <Continuous>    ===================HEMATOLOGIC/ONC ===================  Thrombocytopenia, monitor H&H/Plts     ===================== RENAL =========================  Continue monitoring urine output, I&Os, Bun/Cr  Replete lytes PRN. Keep K> 4 and Mg >2    ==================== GASTROINTESTINAL===================  Tolerating PO DASH/TLC diet.     dextrose 5%. 1000 milliLiter(s) (100 mL/Hr) IV Continuous <Continuous>  sodium chloride 0.9% lock flush 3 milliLiter(s) IV Push every 8 hours    =======================    ENDOCRINE  =====================  Stress hyperglycemia, continue glucose control with admelog sliding scale   Hypothyroidism, c/w Synthroid     dextrose 40% Gel 15 Gram(s) Oral once  dextrose 50% Injectable 12.5 Gram(s) IV Push once  dextrose 50% Injectable 25 Gram(s) IV Push once  glucagon  Injectable 1 milliGRAM(s) IntraMuscular once  insulin lispro (ADMELOG) corrective regimen sliding scale   SubCutaneous three times a day before meals  insulin lispro (ADMELOG) corrective regimen sliding scale   SubCutaneous at bedtime  levothyroxine Injectable 50 MICROGram(s) IV Push at bedtime    ========================INFECTIOUS DISEASE================  Afebrile, WBC downtrending 11.14->10.93   Continue trending WBC and monitoring fever curve       Patient requires continuous monitoring with bedside rhythm monitoring, pulse ox monitoring, and intermittent blood gas analysis. Care plan discussed with ICU care team.     By signing my name below, I, Jackie Fernandez, attest that this documentation has been prepared under the direction and in the presence of Alli Soliman MD   Electronically signed: Gabriel Guardado Matthew Pierce, personally performed the services described in this documentation. all medical record entries made by the scribe were at my direction and in my presence. I have reviewed the chart and agree that the record reflects my personal performance and is accurate and complete  Electronically signed: Alli Soliman MD 02-07-22 @ 08:10

## 2022-02-07 NOTE — H&P ADULT - NSHPPHYSICALEXAM_GEN_ALL_CORE
General: in no acute distress, slightly restless  Neuro: no focal deficits  Psych: anxious, restless  EENT: atraumatic   Pulm: CTA b/l  Card: +S1, S2. RRR. Hypertensive.  Abd: Protuberant, nontender, +BS, +BM today.  Ext: No LE edema, no calf tenderness, +DP/PT pulses b/l  Skin: unremarkable

## 2022-02-07 NOTE — PROGRESS NOTE ADULT - SUBJECTIVE AND OBJECTIVE BOX
Patient seen and examined at the bedside.    Remained critically ill on continuous ICU monitoring.    OBJECTIVE:  Vital Signs Last 24 Hrs  T(C): 37.7 (07 Feb 2022 00:00), Max: 37.7 (07 Feb 2022 00:00)  T(F): 99.9 (07 Feb 2022 00:00), Max: 99.9 (07 Feb 2022 00:00)  HR: 103 (07 Feb 2022 00:00) (103 - 130)  BP: 120/72 (06 Feb 2022 23:45) (96/59 - 130/92)  BP(mean): 104 (06 Feb 2022 21:21) (104 - 104)  RR: 41 (07 Feb 2022 00:00) (18 - 41)  SpO2: 97% (07 Feb 2022 00:00) (93% - 98%)    REVIEW OF SYSTEMS:    + back pain /C/P     Physical Exam:  General: Anxious in bed on continuous ICU monitoring   Neurology: Awake alert and moves all 4 extremities   Eyes: bilaeral pupils equal and reactive   ENT/Neck:  Neck supple, trachea midline, No JVD   Respiratory: B/L occasional rhonchi   CV: RRR, S1S2, no murmurs                  Abdominal: Soft, NT, ND +BS,   Extremities: 1-2+ pedal edema noted, + peripheral pulses   Skin: No Rashes, Hematoma, Ecchymosis                           Assessment:    71 yr Smoker / HTN / HLD / presented with C/P back pain x 10 days   EKG / Troponin negative for ACS  CTA Type B aneurysmal Aortic dissection / infra renal AA / L lung nodule      Plan     NPO   HR /BP control  A-line placement   Target HR <60 / SBP <120  Labetalol   High intensity statin  Serial abd exam /lactates / LE pulses   TTE   Back pain relived with Fentanyl & BP control     Smoker/ LL nodule will need w/u as an outpatient start bronchodilators    Check TSH /AIC   Protonix    DVT boots             ALL MEDICATIONS (delete after use)  MEDICATIONS  (STANDING):  albuterol/ipratropium for Nebulization 3 milliLiter(s) Nebulizer every 6 hours  atorvastatin 80 milliGRAM(s) Oral at bedtime  buDESOnide    Inhalation Suspension 0.5 milliGRAM(s) Inhalation two times a day  budesonide 160 MICROgram(s)/formoterol 4.5 MICROgram(s) Inhaler 2 Puff(s) Inhalation two times a day  dextrose 40% Gel 15 Gram(s) Oral once  dextrose 5%. 1000 milliLiter(s) (50 mL/Hr) IV Continuous <Continuous>  dextrose 5%. 1000 milliLiter(s) (100 mL/Hr) IV Continuous <Continuous>  dextrose 50% Injectable 25 Gram(s) IV Push once  dextrose 50% Injectable 12.5 Gram(s) IV Push once  dextrose 50% Injectable 25 Gram(s) IV Push once  glucagon  Injectable 1 milliGRAM(s) IntraMuscular once  insulin lispro (ADMELOG) corrective regimen sliding scale   SubCutaneous three times a day before meals  insulin lispro (ADMELOG) corrective regimen sliding scale   SubCutaneous at bedtime  labetalol Infusion 0.5 mG/Min (30 mL/Hr) IV Continuous <Continuous>  sodium chloride 0.9% lock flush 3 milliLiter(s) IV Push every 8 hours          Patient requires continuous monitoring with bedside rhythm monitoring, pulse oximetry monitoring, and continuous central venous and arterial pressure monitoring; and intermittent blood gas analysis. Care plan discussed with the ICU care team.   Patient remained critical, at risk for life threatening decompensation.    I have spent 30 minutes providing critical care management to this patient.      I, Jeremy Casas, personally performed the services described in this documentation. all medical record entries made by the scribe were at my direction and in my presence. I have reviewed the chart and agree that the record reflects my personal performance and is accurate and complete  Electronically signed: Jeremy Casas MD

## 2022-02-08 LAB
ALBUMIN SERPL ELPH-MCNC: 4.2 G/DL — SIGNIFICANT CHANGE UP (ref 3.3–5)
ALP SERPL-CCNC: 87 U/L — SIGNIFICANT CHANGE UP (ref 40–120)
ALT FLD-CCNC: 14 U/L — SIGNIFICANT CHANGE UP (ref 10–45)
ANION GAP SERPL CALC-SCNC: 14 MMOL/L — SIGNIFICANT CHANGE UP (ref 5–17)
AST SERPL-CCNC: 16 U/L — SIGNIFICANT CHANGE UP (ref 10–40)
BASOPHILS # BLD AUTO: 0.02 K/UL — SIGNIFICANT CHANGE UP (ref 0–0.2)
BASOPHILS NFR BLD AUTO: 0.3 % — SIGNIFICANT CHANGE UP (ref 0–2)
BILIRUB SERPL-MCNC: 0.6 MG/DL — SIGNIFICANT CHANGE UP (ref 0.2–1.2)
BUN SERPL-MCNC: 11 MG/DL — SIGNIFICANT CHANGE UP (ref 7–23)
CALCIUM SERPL-MCNC: 9.4 MG/DL — SIGNIFICANT CHANGE UP (ref 8.4–10.5)
CHLORIDE SERPL-SCNC: 103 MMOL/L — SIGNIFICANT CHANGE UP (ref 96–108)
CO2 SERPL-SCNC: 21 MMOL/L — LOW (ref 22–31)
CREAT SERPL-MCNC: 0.65 MG/DL — SIGNIFICANT CHANGE UP (ref 0.5–1.3)
EOSINOPHIL # BLD AUTO: 0.65 K/UL — HIGH (ref 0–0.5)
EOSINOPHIL NFR BLD AUTO: 8.5 % — HIGH (ref 0–6)
GLUCOSE BLDC GLUCOMTR-MCNC: 104 MG/DL — HIGH (ref 70–99)
GLUCOSE SERPL-MCNC: 126 MG/DL — HIGH (ref 70–99)
HCT VFR BLD CALC: 40.1 % — SIGNIFICANT CHANGE UP (ref 34.5–45)
HGB BLD-MCNC: 12.8 G/DL — SIGNIFICANT CHANGE UP (ref 11.5–15.5)
IMM GRANULOCYTES NFR BLD AUTO: 0.3 % — SIGNIFICANT CHANGE UP (ref 0–1.5)
LYMPHOCYTES # BLD AUTO: 1.14 K/UL — SIGNIFICANT CHANGE UP (ref 1–3.3)
LYMPHOCYTES # BLD AUTO: 14.9 % — SIGNIFICANT CHANGE UP (ref 13–44)
MCHC RBC-ENTMCNC: 29.2 PG — SIGNIFICANT CHANGE UP (ref 27–34)
MCHC RBC-ENTMCNC: 31.9 GM/DL — LOW (ref 32–36)
MCV RBC AUTO: 91.6 FL — SIGNIFICANT CHANGE UP (ref 80–100)
MONOCYTES # BLD AUTO: 0.77 K/UL — SIGNIFICANT CHANGE UP (ref 0–0.9)
MONOCYTES NFR BLD AUTO: 10.1 % — SIGNIFICANT CHANGE UP (ref 2–14)
NEUTROPHILS # BLD AUTO: 5.05 K/UL — SIGNIFICANT CHANGE UP (ref 1.8–7.4)
NEUTROPHILS NFR BLD AUTO: 65.9 % — SIGNIFICANT CHANGE UP (ref 43–77)
NRBC # BLD: 0 /100 WBCS — SIGNIFICANT CHANGE UP (ref 0–0)
PLATELET # BLD AUTO: 146 K/UL — LOW (ref 150–400)
POTASSIUM SERPL-MCNC: 4 MMOL/L — SIGNIFICANT CHANGE UP (ref 3.5–5.3)
POTASSIUM SERPL-SCNC: 4 MMOL/L — SIGNIFICANT CHANGE UP (ref 3.5–5.3)
PROT SERPL-MCNC: 7.3 G/DL — SIGNIFICANT CHANGE UP (ref 6–8.3)
RBC # BLD: 4.38 M/UL — SIGNIFICANT CHANGE UP (ref 3.8–5.2)
RBC # FLD: 15.3 % — HIGH (ref 10.3–14.5)
SODIUM SERPL-SCNC: 138 MMOL/L — SIGNIFICANT CHANGE UP (ref 135–145)
WBC # BLD: 7.65 K/UL — SIGNIFICANT CHANGE UP (ref 3.8–10.5)
WBC # FLD AUTO: 7.65 K/UL — SIGNIFICANT CHANGE UP (ref 3.8–10.5)

## 2022-02-08 PROCEDURE — 71045 X-RAY EXAM CHEST 1 VIEW: CPT | Mod: 26

## 2022-02-08 PROCEDURE — 93010 ELECTROCARDIOGRAM REPORT: CPT

## 2022-02-08 PROCEDURE — 99232 SBSQ HOSP IP/OBS MODERATE 35: CPT

## 2022-02-08 RX ORDER — LEVOTHYROXINE SODIUM 125 MCG
25 TABLET ORAL DAILY
Refills: 0 | Status: DISCONTINUED | OUTPATIENT
Start: 2022-02-08 | End: 2022-02-09

## 2022-02-08 RX ADMIN — ATORVASTATIN CALCIUM 80 MILLIGRAM(S): 80 TABLET, FILM COATED ORAL at 21:48

## 2022-02-08 RX ADMIN — SODIUM CHLORIDE 3 MILLILITER(S): 9 INJECTION INTRAMUSCULAR; INTRAVENOUS; SUBCUTANEOUS at 06:05

## 2022-02-08 RX ADMIN — Medication 25 MILLIGRAM(S): at 06:05

## 2022-02-08 RX ADMIN — Medication 3 MILLILITER(S): at 22:03

## 2022-02-08 RX ADMIN — BUPROPION HYDROCHLORIDE 150 MILLIGRAM(S): 150 TABLET, EXTENDED RELEASE ORAL at 12:08

## 2022-02-08 RX ADMIN — Medication 1 PATCH: at 18:03

## 2022-02-08 RX ADMIN — SODIUM CHLORIDE 3 MILLILITER(S): 9 INJECTION INTRAMUSCULAR; INTRAVENOUS; SUBCUTANEOUS at 21:13

## 2022-02-08 RX ADMIN — Medication 25 MILLIGRAM(S): at 17:02

## 2022-02-08 RX ADMIN — Medication 3 MILLILITER(S): at 11:44

## 2022-02-08 RX ADMIN — Medication 0.5 MILLIGRAM(S): at 17:02

## 2022-02-08 RX ADMIN — Medication 1 PATCH: at 06:05

## 2022-02-08 RX ADMIN — Medication 3 MILLILITER(S): at 17:02

## 2022-02-08 RX ADMIN — Medication 3 MILLILITER(S): at 06:31

## 2022-02-08 RX ADMIN — Medication 3 MILLILITER(S): at 00:34

## 2022-02-08 RX ADMIN — SODIUM CHLORIDE 3 MILLILITER(S): 9 INJECTION INTRAMUSCULAR; INTRAVENOUS; SUBCUTANEOUS at 13:00

## 2022-02-08 RX ADMIN — Medication 1 PATCH: at 12:08

## 2022-02-08 RX ADMIN — Medication 1 PATCH: at 11:00

## 2022-02-08 RX ADMIN — Medication 0.5 MILLIGRAM(S): at 06:31

## 2022-02-08 NOTE — PROGRESS NOTE ADULT - PROBLEM SELECTOR PLAN 2
Continue Lopressor 25 mg q12h for BP control, uptitrate prn   DASH diet
Continue Lopressor 25 mg q12h for BP control, uptitrate prn   DASH diet

## 2022-02-08 NOTE — PROGRESS NOTE ADULT - PROBLEM SELECTOR PLAN 1
CTA C/A/P: chronic appearing dissection versus intramural thrombus of the descending aorta, no acute dissection  No acute operative intervention at this time, continue medical management   Labetalol gtt weaned to off 2/7   Continue Lopressor 25 mg q12h for BP control, uptitrate prn  Continue off DVT ppx for now 2/2 thrombocytopenia    Dispo: home w/ PT when medically cleared
CTA C/A/P: chronic appearing dissection versus intramural thrombus of the descending aorta, no acute dissection  No acute operative intervention at this time, continue medical management   Labetalol gtt weaned to off 2/7   Continue Lopressor 25 mg q12h for BP control, uptitrate prn  Continue off DVT ppx for now 2/2 thrombocytopenia    Dispo: home w/ PT when medically cleared

## 2022-02-08 NOTE — PROGRESS NOTE ADULT - ASSESSMENT
72 y/o Polish speaking F smoker w/ PMHx of hypothyroidism, HTN, HLD initially presented to [facility] ED c/o chest pain radiating to the back x 10 days, which worsened in severity on day of admission. Pt had gone to see her PCP, who sent her for a CT abdomen that pt states revealed "my aorta was enlarged" and was told to go to the ED for further evaluation. CTA was performed in the ED, which revealed a chronic-appearing dissection vs. intramural thrombus of the descending aorta, which was mildly dilated, with no evidence of dissection. Pt received Labetalol 10 IVP x 1 and was started on Esmolol gtt for BP control in ED. Pt then transferred to CTU for medical management of type B aortic dissection.     2/6 CTA C/A/P: chronic appearing dissection versus intramural thrombus of the descending aorta, which is mildly dilated. No evidence of acute dissection. small infrarenal abdominal aortic aneurysm. Mild marc ground-glass and nodular opacities throughout all lobes of the lungs. A 1.3cm left upper lobe lung nodule   2/7 Admitted for type B dissection. Pt later received to floor on 2 Bond - VSS, NAD. IV Labetalol weaned to off this AM, started on Lopressor 25 mg PO q12h for BP management. Otherwise continue current medication regimen. Thrombocytopenic today w/ platelet count 147 - will continue off DVT ppx for now, daily CBC to trend.  Dispo: Home w/ PT when medically cleared
72 y/o Polish speaking F smoker w/ PMHx of hypothyroidism, HTN, HLD initially presented to [facility] ED c/o chest pain radiating to the back x 10 days, which worsened in severity on day of admission. Pt had gone to see her PCP, who sent her for a CT abdomen that pt states revealed "my aorta was enlarged" and was told to go to the ED for further evaluation. CTA was performed in the ED, which revealed a chronic-appearing dissection vs. intramural thrombus of the descending aorta, which was mildly dilated, with no evidence of dissection. Pt received Labetalol 10 IVP x 1 and was started on Esmolol gtt for BP control in ED. Pt then transferred to CTU for medical management of type B aortic dissection.     2/6 CTA C/A/P: chronic appearing dissection versus intramural thrombus of the descending aorta, which is mildly dilated. No evidence of acute dissection. small infrarenal abdominal aortic aneurysm. Mild marc ground-glass and nodular opacities throughout all lobes of the lungs. A 1.3cm left upper lobe lung nodule   2/7 Admitted for type B dissection. Pt later received to floor on 2 Bond - VSS, NAD. IV Labetalol weaned to off this AM, started on Lopressor 25 mg PO q12h for BP management. Otherwise continue current medication regimen. Thrombocytopenic today w/ platelet count 147 - will continue off DVT ppx for now, daily CBC to trend.  2/8 VSS - no c/o chest or abd pain-  c/o low back pain - reproducible. & not the same pain that brought her to the hospital.  tylenol for back pain- . will place on Aortic registry for f/u.  spoke w/ pt through    Dispo: Home w/ PT when medically cleared - likely tomorrow

## 2022-02-08 NOTE — PROGRESS NOTE ADULT - SUBJECTIVE AND OBJECTIVE BOX
VITAL SIGNS-Telemetry:  SR   Vital Signs Last 24 Hrs  T(C): 37 (22 @ 05:00), Max: 37 (22 @ 05:00)  T(F): 98.6 (22 @ 05:00), Max: 98.6 (22 @ 05:00)  HR: 86 (22 @ 05:00) (76 - 98)  BP: 104/64 (22 @ 05:00) (99/68 - 147/75)  RR: 18 (22 @ 05:00) (17 - 59)  SpO2: 91% (22 @ 05:00) (91% - 100%)          @ 07:01  -   @ 07:00  --------------------------------------------------------  IN: 450 mL / OUT: 700 mL / NET: -250 mL     @ 07:01  -   @ 11:20  --------------------------------------------------------  IN: 100 mL / OUT: 200 mL / NET: -100 mL    Daily     Daily Weight in k.3 (2022 10:00)    CAPILLARY BLOOD GLUCOSE  POCT Blood Glucose.: 136 mg/dL (2022 21:58)  POCT Blood Glucose.: 130 mg/dL (2022 16:24)  POCT Blood Glucose.: 127 mg/dL (2022 11:35)         PHYSICAL EXAM:  Neurology: alert and oriented x 3, nonfocal, no gross deficits  CV : S1S2  Lungs: cta  Abdomen: soft, nontender, nondistended, positive bowel sounds, last bowel movement       +bm  Extremities:     no c/c/e    albuterol/ipratropium for Nebulization 3 milliLiter(s) Nebulizer every 6 hours  atorvastatin 80 milliGRAM(s) Oral at bedtime  buDESOnide    Inhalation Suspension 0.5 milliGRAM(s) Inhalation two times a day  buPROPion XL (24-Hour) . 150 milliGRAM(s) Oral daily  influenza  Vaccine (HIGH DOSE) 0.7 milliLiter(s) IntraMuscular once  levothyroxine 25 MICROGram(s) Oral daily  metoprolol tartrate 25 milliGRAM(s) Oral every 12 hours  nicotine -  14 mG/24Hr(s) Patch 1 patch Transdermal daily  sodium chloride 0.9% lock flush 3 milliLiter(s) IV Push every 8 hours    Physical Therapy Rec:   Home  [ x ]   Home w/ PT  [  ]  Rehab  [  ]  Discussed with Cardiothoracic Team at AM rounds.

## 2022-02-08 NOTE — PROGRESS NOTE ADULT - PROBLEM SELECTOR PLAN 4
Continue on Synthroid 50 mcg IVP at bedtime   Will discuss transition to PO Synthroid w/ Dr. Sánchez
Continue on Synthroid 50 mcg IVP at bedtime   Will discuss transition to PO Synthroid w/ Dr. Sánchez

## 2022-02-08 NOTE — PROGRESS NOTE ADULT - PROBLEM SELECTOR PLAN 5
Currently on 2L O2 via NC   Continue Pulmicort BID and Duonebs prn   Continue Wellbutrin 150 mg QD   Continue Nicotine patch QD
Currently on 2L O2 via NC   Continue Pulmicort BID and Duonebs prn   Continue Wellbutrin 150 mg QD   Continue Nicotine patch QD

## 2022-02-09 ENCOUNTER — TRANSCRIPTION ENCOUNTER (OUTPATIENT)
Age: 72
End: 2022-02-09

## 2022-02-09 VITALS
SYSTOLIC BLOOD PRESSURE: 126 MMHG | TEMPERATURE: 99 F | DIASTOLIC BLOOD PRESSURE: 74 MMHG | HEART RATE: 86 BPM | OXYGEN SATURATION: 93 % | RESPIRATION RATE: 18 BRPM

## 2022-02-09 LAB
-  AMIKACIN: SIGNIFICANT CHANGE UP
-  AMOXICILLIN/CLAVULANIC ACID: SIGNIFICANT CHANGE UP
-  AMPICILLIN/SULBACTAM: SIGNIFICANT CHANGE UP
-  AMPICILLIN: SIGNIFICANT CHANGE UP
-  AZTREONAM: SIGNIFICANT CHANGE UP
-  CEFAZOLIN: SIGNIFICANT CHANGE UP
-  CEFEPIME: SIGNIFICANT CHANGE UP
-  CEFOXITIN: SIGNIFICANT CHANGE UP
-  CEFTRIAXONE: SIGNIFICANT CHANGE UP
-  CIPROFLOXACIN: SIGNIFICANT CHANGE UP
-  ERTAPENEM: SIGNIFICANT CHANGE UP
-  GENTAMICIN: SIGNIFICANT CHANGE UP
-  IMIPENEM: SIGNIFICANT CHANGE UP
-  LEVOFLOXACIN: SIGNIFICANT CHANGE UP
-  MEROPENEM: SIGNIFICANT CHANGE UP
-  NITROFURANTOIN: SIGNIFICANT CHANGE UP
-  PIPERACILLIN/TAZOBACTAM: SIGNIFICANT CHANGE UP
-  TIGECYCLINE: SIGNIFICANT CHANGE UP
-  TOBRAMYCIN: SIGNIFICANT CHANGE UP
-  TRIMETHOPRIM/SULFAMETHOXAZOLE: SIGNIFICANT CHANGE UP
ANION GAP SERPL CALC-SCNC: 13 MMOL/L — SIGNIFICANT CHANGE UP (ref 5–17)
BUN SERPL-MCNC: 17 MG/DL — SIGNIFICANT CHANGE UP (ref 7–23)
CALCIUM SERPL-MCNC: 9.9 MG/DL — SIGNIFICANT CHANGE UP (ref 8.4–10.5)
CHLORIDE SERPL-SCNC: 101 MMOL/L — SIGNIFICANT CHANGE UP (ref 96–108)
CO2 SERPL-SCNC: 23 MMOL/L — SIGNIFICANT CHANGE UP (ref 22–31)
CREAT SERPL-MCNC: 0.79 MG/DL — SIGNIFICANT CHANGE UP (ref 0.5–1.3)
CULTURE RESULTS: SIGNIFICANT CHANGE UP
GLUCOSE SERPL-MCNC: 133 MG/DL — HIGH (ref 70–99)
HCT VFR BLD CALC: 41.9 % — SIGNIFICANT CHANGE UP (ref 34.5–45)
HGB BLD-MCNC: 13.1 G/DL — SIGNIFICANT CHANGE UP (ref 11.5–15.5)
MCHC RBC-ENTMCNC: 28.9 PG — SIGNIFICANT CHANGE UP (ref 27–34)
MCHC RBC-ENTMCNC: 31.3 GM/DL — LOW (ref 32–36)
MCV RBC AUTO: 92.3 FL — SIGNIFICANT CHANGE UP (ref 80–100)
METHOD TYPE: SIGNIFICANT CHANGE UP
NRBC # BLD: 0 /100 WBCS — SIGNIFICANT CHANGE UP (ref 0–0)
ORGANISM # SPEC MICROSCOPIC CNT: SIGNIFICANT CHANGE UP
ORGANISM # SPEC MICROSCOPIC CNT: SIGNIFICANT CHANGE UP
PLATELET # BLD AUTO: 128 K/UL — LOW (ref 150–400)
POTASSIUM SERPL-MCNC: 4.5 MMOL/L — SIGNIFICANT CHANGE UP (ref 3.5–5.3)
POTASSIUM SERPL-SCNC: 4.5 MMOL/L — SIGNIFICANT CHANGE UP (ref 3.5–5.3)
RBC # BLD: 4.54 M/UL — SIGNIFICANT CHANGE UP (ref 3.8–5.2)
RBC # FLD: 15.3 % — HIGH (ref 10.3–14.5)
SODIUM SERPL-SCNC: 137 MMOL/L — SIGNIFICANT CHANGE UP (ref 135–145)
SPECIMEN SOURCE: SIGNIFICANT CHANGE UP
WBC # BLD: 8.4 K/UL — SIGNIFICANT CHANGE UP (ref 3.8–10.5)
WBC # FLD AUTO: 8.4 K/UL — SIGNIFICANT CHANGE UP (ref 3.8–10.5)

## 2022-02-09 PROCEDURE — 93306 TTE W/DOPPLER COMPLETE: CPT

## 2022-02-09 PROCEDURE — 74174 CTA ABD&PLVS W/CONTRAST: CPT | Mod: MA

## 2022-02-09 PROCEDURE — 87186 SC STD MICRODIL/AGAR DIL: CPT

## 2022-02-09 PROCEDURE — 93005 ELECTROCARDIOGRAM TRACING: CPT

## 2022-02-09 PROCEDURE — 83880 ASSAY OF NATRIURETIC PEPTIDE: CPT

## 2022-02-09 PROCEDURE — 82947 ASSAY GLUCOSE BLOOD QUANT: CPT

## 2022-02-09 PROCEDURE — 84295 ASSAY OF SERUM SODIUM: CPT

## 2022-02-09 PROCEDURE — 84132 ASSAY OF SERUM POTASSIUM: CPT

## 2022-02-09 PROCEDURE — 74018 RADEX ABDOMEN 1 VIEW: CPT

## 2022-02-09 PROCEDURE — 85025 COMPLETE CBC W/AUTO DIFF WBC: CPT

## 2022-02-09 PROCEDURE — 82330 ASSAY OF CALCIUM: CPT

## 2022-02-09 PROCEDURE — 86850 RBC ANTIBODY SCREEN: CPT

## 2022-02-09 PROCEDURE — 86901 BLOOD TYPING SEROLOGIC RH(D): CPT

## 2022-02-09 PROCEDURE — P9045: CPT

## 2022-02-09 PROCEDURE — 81001 URINALYSIS AUTO W/SCOPE: CPT

## 2022-02-09 PROCEDURE — 80048 BASIC METABOLIC PNL TOTAL CA: CPT

## 2022-02-09 PROCEDURE — 85610 PROTHROMBIN TIME: CPT

## 2022-02-09 PROCEDURE — 83036 HEMOGLOBIN GLYCOSYLATED A1C: CPT

## 2022-02-09 PROCEDURE — 93880 EXTRACRANIAL BILAT STUDY: CPT

## 2022-02-09 PROCEDURE — 85730 THROMBOPLASTIN TIME PARTIAL: CPT

## 2022-02-09 PROCEDURE — 87640 STAPH A DNA AMP PROBE: CPT

## 2022-02-09 PROCEDURE — 83605 ASSAY OF LACTIC ACID: CPT

## 2022-02-09 PROCEDURE — 82803 BLOOD GASES ANY COMBINATION: CPT

## 2022-02-09 PROCEDURE — 94640 AIRWAY INHALATION TREATMENT: CPT

## 2022-02-09 PROCEDURE — 82435 ASSAY OF BLOOD CHLORIDE: CPT

## 2022-02-09 PROCEDURE — 80053 COMPREHEN METABOLIC PANEL: CPT

## 2022-02-09 PROCEDURE — 87086 URINE CULTURE/COLONY COUNT: CPT

## 2022-02-09 PROCEDURE — 84436 ASSAY OF TOTAL THYROXINE: CPT

## 2022-02-09 PROCEDURE — 71275 CT ANGIOGRAPHY CHEST: CPT | Mod: MA

## 2022-02-09 PROCEDURE — 71045 X-RAY EXAM CHEST 1 VIEW: CPT

## 2022-02-09 PROCEDURE — 96374 THER/PROPH/DIAG INJ IV PUSH: CPT

## 2022-02-09 PROCEDURE — 84443 ASSAY THYROID STIM HORMONE: CPT

## 2022-02-09 PROCEDURE — 85018 HEMOGLOBIN: CPT

## 2022-02-09 PROCEDURE — 97161 PT EVAL LOW COMPLEX 20 MIN: CPT

## 2022-02-09 PROCEDURE — 36415 COLL VENOUS BLD VENIPUNCTURE: CPT

## 2022-02-09 PROCEDURE — 85014 HEMATOCRIT: CPT

## 2022-02-09 PROCEDURE — 86900 BLOOD TYPING SEROLOGIC ABO: CPT

## 2022-02-09 PROCEDURE — 82962 GLUCOSE BLOOD TEST: CPT

## 2022-02-09 PROCEDURE — 87641 MR-STAPH DNA AMP PROBE: CPT

## 2022-02-09 PROCEDURE — 99285 EMERGENCY DEPT VISIT HI MDM: CPT | Mod: 25

## 2022-02-09 PROCEDURE — 85027 COMPLETE CBC AUTOMATED: CPT

## 2022-02-09 PROCEDURE — 86803 HEPATITIS C AB TEST: CPT

## 2022-02-09 PROCEDURE — 84484 ASSAY OF TROPONIN QUANT: CPT

## 2022-02-09 PROCEDURE — U0003: CPT

## 2022-02-09 RX ORDER — METOPROLOL TARTRATE 50 MG
1 TABLET ORAL
Qty: 60 | Refills: 0
Start: 2022-02-09 | End: 2022-03-10

## 2022-02-09 RX ORDER — NICOTINE POLACRILEX 2 MG
1 GUM BUCCAL
Qty: 14 | Refills: 0
Start: 2022-02-09 | End: 2022-02-22

## 2022-02-09 RX ORDER — BUDESONIDE, MICRONIZED 100 %
1 POWDER (GRAM) MISCELLANEOUS
Qty: 1 | Refills: 0
Start: 2022-02-09 | End: 2022-03-10

## 2022-02-09 RX ORDER — BUPROPION HYDROCHLORIDE 150 MG/1
1 TABLET, EXTENDED RELEASE ORAL
Qty: 30 | Refills: 0
Start: 2022-02-09 | End: 2022-03-10

## 2022-02-09 RX ORDER — ATORVASTATIN CALCIUM 80 MG/1
1 TABLET, FILM COATED ORAL
Qty: 30 | Refills: 0
Start: 2022-02-09 | End: 2022-03-10

## 2022-02-09 RX ORDER — LEVOTHYROXINE SODIUM 125 MCG
1 TABLET ORAL
Qty: 30 | Refills: 0
Start: 2022-02-09 | End: 2022-03-10

## 2022-02-09 RX ORDER — LISINOPRIL 2.5 MG/1
0 TABLET ORAL
Qty: 0 | Refills: 0 | DISCHARGE

## 2022-02-09 RX ADMIN — Medication 0.5 MILLIGRAM(S): at 05:22

## 2022-02-09 RX ADMIN — Medication 1 PATCH: at 12:18

## 2022-02-09 RX ADMIN — Medication 3 MILLILITER(S): at 12:11

## 2022-02-09 RX ADMIN — SODIUM CHLORIDE 3 MILLILITER(S): 9 INJECTION INTRAMUSCULAR; INTRAVENOUS; SUBCUTANEOUS at 13:00

## 2022-02-09 RX ADMIN — SODIUM CHLORIDE 3 MILLILITER(S): 9 INJECTION INTRAMUSCULAR; INTRAVENOUS; SUBCUTANEOUS at 05:26

## 2022-02-09 RX ADMIN — Medication 1 PATCH: at 12:11

## 2022-02-09 RX ADMIN — Medication 25 MICROGRAM(S): at 05:21

## 2022-02-09 RX ADMIN — BUPROPION HYDROCHLORIDE 150 MILLIGRAM(S): 150 TABLET, EXTENDED RELEASE ORAL at 12:11

## 2022-02-09 RX ADMIN — Medication 1 PATCH: at 17:39

## 2022-02-09 RX ADMIN — Medication 25 MILLIGRAM(S): at 17:39

## 2022-02-09 RX ADMIN — Medication 3 MILLILITER(S): at 05:22

## 2022-02-09 RX ADMIN — Medication 25 MILLIGRAM(S): at 05:22

## 2022-02-09 RX ADMIN — Medication 1 PATCH: at 05:54

## 2022-02-09 NOTE — DISCHARGE NOTE PROVIDER - NSDCPNSUBOBJ_GEN_ALL_CORE
VITAL SIGNS    Subjective:  Sandra # [id number]. Denies CP, palpitation, SOB, TEJADA, HA, dizziness, N/V/D, fever or chills.  No acute event noted overnight.     Telemetry: NSR 70-80     Vital Signs Last 24 Hrs  T(C): 36.3 (02-09-22 @ 11:41), Max: 37 (02-09-22 @ 04:13)  T(F): 97.4 (02-09-22 @ 11:41), Max: 98.6 (02-09-22 @ 04:13)  HR: 82 (02-09-22 @ 12:05) (77 - 88)  BP: 120/68 (02-09-22 @ 12:05) (109/68 - 120/68)  RR: 17 (02-09-22 @ 12:05) (17 - 18)  SpO2: 94% (02-09-22 @ 12:05) (91% - 97%)          02-08 @ 07:01  -  02-09 @ 07:00  --------------------------------------------------------  IN: 820 mL / OUT: 700 mL / NET: 120 mL    02-09 @ 07:01  -  02-09 @ 16:28  --------------------------------------------------------  IN: 600 mL / OUT: 400 mL / NET: 200 mL    CAPILLARY BLOOD GLUCOSE    POCT Blood Glucose.: 104 mg/dL (08 Feb 2022 16:40)                          13.1   8.40  )-----------( 128      ( 09 Feb 2022 05:35 )             41.9      02-09    137  |  101  |  17  ----------------------------<  133<H>  4.5   |  23  |  0.79    Ca    9.9      09 Feb 2022 05:35    TPro  7.3  /  Alb  4.2  /  TBili  0.6  /  DBili  x   /  AST  16  /  ALT  14  /  AlkPhos  87  02-08    PHYSICAL EXAM    Neurology: alert and oriented x 3, nonfocal, no gross deficits    CV: (+) S1 and S2, No murmurs, rubs, gallops or clicks     Lungs: CTA B/L     Abdomen: soft, nontender, nondistended, positive bowel sounds, (+) Flatus; (+) BM     :  Voiding               Extremities:  B/L LE (-) edema; negative calf tenderness; (+) 2 DP palpable        albuterol/ipratropium for Nebulization 3 milliLiter(s) Nebulizer every 6 hours  atorvastatin 80 milliGRAM(s) Oral at bedtime  buDESOnide    Inhalation Suspension 0.5 milliGRAM(s) Inhalation two times a day  buPROPion XL (24-Hour) . 150 milliGRAM(s) Oral daily  influenza  Vaccine (HIGH DOSE) 0.7 milliLiter(s) IntraMuscular once  levothyroxine 25 MICROGram(s) Oral daily  metoprolol tartrate 25 milliGRAM(s) Oral every 12 hours  nicotine -  14 mG/24Hr(s) Patch 1 patch Transdermal daily  sodium chloride 0.9% lock flush 3 milliLiter(s) IV Push every 8 hours    Physical Therapy Rec:   Home  [  ]   Home w/ PT  [ X ]  Rehab  [  ]    Discussed with Cardiothoracic Team at AM rounds.

## 2022-02-09 NOTE — DISCHARGE NOTE NURSING/CASE MANAGEMENT/SOCIAL WORK - NSDCPEFALRISK_GEN_ALL_CORE
For information on Fall & Injury Prevention, visit: [email or web address] OR  [email or web address] OR  https://www.cdc.gov/steadi/patient.html

## 2022-02-09 NOTE — DISCHARGE NOTE PROVIDER - HOSPITAL COURSE
72 y/o Polish speaking F smoker w/ PMHx of hypothyroidism, HTN, HLD initially presented to [facility] ED c/o chest pain radiating to the back x 10 days, which worsened in severity on day of admission. Pt had gone to see her PCP, who sent her for a CT abdomen that pt states revealed "my aorta was enlarged" and was told to go to the ED for further evaluation. CTA was performed in the ED, which revealed a chronic-appearing dissection vs. intramural thrombus of the descending aorta, which was mildly dilated, with no evidence of dissection. Pt received Labetalol 10 IVP x 1 and was started on Esmolol gtt for BP control in ED. Pt then transferred to CTU for medical management of type B aortic dissection.     Hospital Course:  2/6 CTA C/A/P: chronic appearing dissection versus intramural thrombus of the descending aorta, which is mildly dilated. No evidence of acute dissection. small infrarenal abdominal aortic aneurysm. Mild marc ground-glass and nodular opacities throughout all lobes of the lungs. A 1.3cm left upper lobe lung nodule   2/7 Admitted for type B dissection. Pt later received to floor on 2 Bond - VSS, NAD. IV Labetalol weaned to off this AM, started on Lopressor 25 mg PO q12h for BP management. Otherwise continue current medication regimen. Thrombocytopenic today w/ platelet count 147 - will continue off DVT ppx for now, daily CBC to trend.  2/8 VSS - no c/o chest or abd pain-  c/o low back pain - reproducible. & not the same pain that brought her to the hospital.  tylenol for back pain- . will place on Aortic registry for f/u.  spoke w/ pt through .   2/9 VVS; Patient medically cleared for discharge home per Dr. Sánchez.  Patient registered in Aortic Registry follow up in 6 months for repeat chest CTA.

## 2022-02-09 NOTE — DISCHARGE NOTE PROVIDER - NSDCFUADDAPPT_GEN_ALL_CORE_FT
1. You are currently registered in the Cardio thoracic Aortic Registry list and will follow up in 6 months for a repeat chest CTA, the office will contact you to schedule an appointment at [phone].   2. Please schedule an appointment with your PCP in 1 week for strict blood pressure management, call the office to schedule an appointment.

## 2022-02-09 NOTE — DISCHARGE NOTE PROVIDER - CARE PROVIDER_API CALL
Clayton Sánchez)  Surgery; Thoracic and Cardiac Surgery  [address] [place] [address]  Phone: [phone]  Fax: [phone]  Follow Up Time: Routine    Rafy Callahan)  Internal Medicine  [address] [address] [place] [address]  Phone: [phone]  Fax: [phone]  Established Patient  Follow Up Time: 1 week

## 2022-02-09 NOTE — DISCHARGE NOTE NURSING/CASE MANAGEMENT/SOCIAL WORK - PATIENT PORTAL LINK FT
You can access the FollowMyHealth Patient Portal offered by [facility] by registering at the following website: [email or web address]. By joining [facility]’s FollowMyHealth portal, you will also be able to view your health information using other applications (apps) compatible with our system.

## 2022-02-09 NOTE — DISCHARGE NOTE PROVIDER - PROVIDER TOKENS
PROVIDER:[TOKEN:[[id number]],FOLLOWUP:[Routine]],PROVIDER:[TOKEN:[[id number]],FOLLOWUP:[1 week],ESTABLISHEDPATIENT:[T]]

## 2022-02-09 NOTE — DISCHARGE NOTE PROVIDER - NSDCCPCAREPLAN_GEN_ALL_CORE_FT
PRINCIPAL DISCHARGE DIAGNOSIS  Diagnosis: Aortic dissection  Assessment and Plan of Treatment:   1. Follow up with Dr. Sánchez in 6 months for a repeat chest CTA.    2. Weight yourself daily and notify any weight gain greater than 2-3 pounds in 24 hours.  3. Regular DASH diet - low fat, low cholesterol, no added salt.  4. No heavy lifting   5. Increase Activity as tolerated.   6. Continue with current medication regimen as intructed on your discharge paperwork.

## 2022-02-09 NOTE — DISCHARGE NOTE PROVIDER - NSDCMRMEDTOKEN_GEN_ALL_CORE_FT
aspirin:   atorvastatin 80 mg oral tablet: 1 tab(s) orally once a day (at bedtime)  buPROPion 150 mg/24 hours (XL) oral tablet, extended release: 1 tab(s) orally once a day  levothyroxine 25 mcg (0.025 mg) oral tablet: 1 tab(s) orally once a day  metoprolol tartrate 25 mg oral tablet: 1 tab(s) orally every 12 hours  nicotine 14 mg/24 hr transdermal film, extended release: 1 patch transdermal once a day   Pulmicort Flexhaler 180 mcg/inh inhalation powder: 1 puff(s) inhaled 2 times a day

## 2022-02-10 PROBLEM — Z00.00 ENCOUNTER FOR PREVENTIVE HEALTH EXAMINATION: Status: ACTIVE | Noted: 2022-02-10

## 2022-08-29 DIAGNOSIS — I71.9 AORTIC ANEURYSM OF UNSPECIFIED SITE, W/OUT RUPTURE: ICD-10-CM

## 2022-09-01 PROBLEM — E78.5 HYPERLIPIDEMIA, UNSPECIFIED: Chronic | Status: ACTIVE | Noted: 2022-02-06

## 2022-09-01 PROBLEM — I10 ESSENTIAL (PRIMARY) HYPERTENSION: Chronic | Status: ACTIVE | Noted: 2022-02-06

## 2022-09-07 ENCOUNTER — APPOINTMENT (OUTPATIENT)
Dept: CT IMAGING | Facility: CLINIC | Age: 72
End: 2022-09-07

## 2022-09-07 ENCOUNTER — RESULT REVIEW (OUTPATIENT)
Age: 72
End: 2022-09-07

## 2022-09-07 ENCOUNTER — OUTPATIENT (OUTPATIENT)
Dept: OUTPATIENT SERVICES | Facility: HOSPITAL | Age: 72
LOS: 1 days | End: 2022-09-07
Payer: COMMERCIAL

## 2022-09-07 DIAGNOSIS — Z00.8 ENCOUNTER FOR OTHER GENERAL EXAMINATION: ICD-10-CM

## 2022-09-07 PROCEDURE — 71275 CT ANGIOGRAPHY CHEST: CPT | Mod: 26

## 2022-09-07 PROCEDURE — 74174 CTA ABD&PLVS W/CONTRAST: CPT

## 2022-09-07 PROCEDURE — 74174 CTA ABD&PLVS W/CONTRAST: CPT | Mod: 26

## 2022-09-07 PROCEDURE — 71275 CT ANGIOGRAPHY CHEST: CPT

## 2023-08-02 NOTE — ED ADULT NURSE NOTE - NSIMPLEMENTINTERV_GEN_ALL_ED
I called and spoke with patient about her recent test results. I explained her [place] labs were positive for mononucleosis. I then phoned patients mother to discuss results in more detail but had to leave a message. Fortunately the patient states she is feeling better. Apparently they went to another  yesterday and had a monotest there that was negative. I explained to patient the labs we did (blood work) were more sensitive and do indicate an acute infection.Her EBV IgG and IgM were markedly elevated. Most likely consistent with acute infection.  Her CMV IgM was also positive/elevated with a negative CMV IgG.   
Implemented All Universal Safety Interventions:  [place] to call system. Call bell, personal items and telephone within reach. Instruct patient to call for assistance. Room bathroom lighting operational. Non-slip footwear when patient is off stretcher. Physically safe environment: no spills, clutter or unnecessary equipment. Stretcher in lowest position, wheels locked, appropriate side rails in place.